# Patient Record
Sex: FEMALE | Race: WHITE | NOT HISPANIC OR LATINO | Employment: FULL TIME | URBAN - METROPOLITAN AREA
[De-identification: names, ages, dates, MRNs, and addresses within clinical notes are randomized per-mention and may not be internally consistent; named-entity substitution may affect disease eponyms.]

---

## 2017-01-10 ENCOUNTER — ROUTINE PRENATAL (OUTPATIENT)
Dept: OBSTETRICS AND GYNECOLOGY | Facility: CLINIC | Age: 27
End: 2017-01-10
Payer: OTHER GOVERNMENT

## 2017-01-10 VITALS
WEIGHT: 152.13 LBS | SYSTOLIC BLOOD PRESSURE: 100 MMHG | BODY MASS INDEX: 27.82 KG/M2 | DIASTOLIC BLOOD PRESSURE: 62 MMHG

## 2017-01-10 DIAGNOSIS — Z34.80 SUPERVISION OF OTHER NORMAL PREGNANCY: Primary | ICD-10-CM

## 2017-01-10 PROCEDURE — 99999 PR PBB SHADOW E&M-EST. PATIENT-LVL II: CPT | Mod: PBBFAC,,, | Performed by: ADVANCED PRACTICE MIDWIFE

## 2017-01-10 PROCEDURE — 0502F SUBSEQUENT PRENATAL CARE: CPT | Mod: ,,, | Performed by: ADVANCED PRACTICE MIDWIFE

## 2017-01-10 PROCEDURE — 87081 CULTURE SCREEN ONLY: CPT

## 2017-01-10 PROCEDURE — 99212 OFFICE O/P EST SF 10 MIN: CPT | Mod: PBBFAC | Performed by: ADVANCED PRACTICE MIDWIFE

## 2017-01-10 NOTE — PROGRESS NOTES
Pt doing well, no c/o 2+ glucose in urine dip - pt had lemonade. Discussed s/s labor, FKCs, warning signs to report. GBS collected. RTC 1wk.  Coffective counseling sheet Protect Breastfeeding discussed with mother. Reinforced avoidence of artifical nipples and formula, unless medically indicated.  Encouraged mother to download Coffective mobile eddi if she has not already done so. Mother verbalizes understanding.  Coffective counseling sheet Nourish discussed with mother. Reinforced basic breastfeeding position and latch as well as proper hand expression technique. Encouraged mother to download Coffective mobile eddi if she has not already done so.  Mother verbalizes understanding.

## 2017-01-10 NOTE — MR AVS SNAPSHOT
[facility]  [address]  [place] [address]  Phone: [phone]  Fax: [phone]                  Gwen Herrera   1/10/2017 2:45 PM   Routine Prenatal    Description:  Female : 1990   Provider:  Terra Shah CNM   Department:  [facility]           Reason for Visit     Routine Prenatal Visit                To Do List           Future Appointments        Provider Department Dept Phone    2017 3:00 PM Terra Shah CNM [facility] -[phone]      Goals (5 Years of Data)     None      [facility]     [facility] -  Assistance  Registered nurses in the [facility] On Call Center provide clinical advisement, health education, appointment booking, and other advisory services.  Call for this free service at [phone].             Medications           Message regarding Medications     Verify the changes and/or additions to your medication regime listed below are the same as discussed with your clinician today.  If any of these changes or additions are incorrect, please notify your healthcare provider.             Verify that the below list of medications is an accurate representation of the medications you are currently taking.  If none reported, the list may be blank. If incorrect, please contact your healthcare provider. Carry this list with you in case of emergency.           Current Medications     prenatal vitamin #45-iron-FA 28 mg iron- 1 mg Chew Take by mouth.           Clinical Reference Information           Prenatal Vitals     Enc. Date GA Prenatal Vitals Prenatal Pulse Pain Level Urine Albumin/Glucose Edema Presentation Dilation/Effacement/Station    1/10/17 36w0d 100/62 / 69 kg (152 lb 1.9 oz)  / 138 / Present   Negative / 2+       16 33w0d 124/72 / 68.5 kg (151 lb 0.2 oz) 35 cm / 130 / Present  0 Trace / Trace None / None / None / No      16 30w0d 108/70 / 64.9 kg (143 lb) 29 cm / 142  / Present   Negative /  Negative None / None      11/14/16 27w6d 96/62 / 65.3 kg (143 lb 15.4 oz) 27 cm / 136 / Present  0 Negative / Negative None / None      10/18/16 24w0d 122/70 / 62.6 kg (138 lb) 24 cm / 148 / Present   Trace / 1+ None / None      9/20/16 20w0d 106/68 / 63 kg (138 lb 14.2 oz) 20 cm / u/s / Present  0 Negative / Negative None / None      8/23/16 16w0d 120/60 / 60 kg (132 lb 4.4 oz)  / 158 / Absent   Negative / Negative       7/26/16 12w0d 112/62 / 58.3 kg (128 lb 8.5 oz)  / 164 / Absent  0 Negative / Negative       6/28/16 8w0d 112/72 / 58.1 kg (128 lb)  / 161 / Absent   Negative / Negative         Vital Signs - Last Recorded  Most recent update: 1/10/2017  2:45 PM by Fabiola Mckinley MA    BP Wt LMP BMI       100/62 69 kg (152 lb 1.9 oz) 05/03/2016 27.82 kg/m2       Allergies as of 1/10/2017     Penicillins      Immunizations Administered on Date of Encounter - 1/10/2017     None

## 2017-01-13 LAB — BACTERIA SPEC AEROBE CULT: NORMAL

## 2017-01-17 ENCOUNTER — ROUTINE PRENATAL (OUTPATIENT)
Dept: OBSTETRICS AND GYNECOLOGY | Facility: CLINIC | Age: 27
End: 2017-01-17
Payer: OTHER GOVERNMENT

## 2017-01-17 VITALS
SYSTOLIC BLOOD PRESSURE: 120 MMHG | BODY MASS INDEX: 27.82 KG/M2 | DIASTOLIC BLOOD PRESSURE: 62 MMHG | WEIGHT: 152.13 LBS

## 2017-01-17 DIAGNOSIS — Z34.80 ENCOUNTER FOR SUPERVISION OF NORMAL PREGNANCY IN MULTIGRAVIDA: Primary | ICD-10-CM

## 2017-01-17 PROCEDURE — 0502F SUBSEQUENT PRENATAL CARE: CPT | Mod: ,,, | Performed by: ADVANCED PRACTICE MIDWIFE

## 2017-01-17 PROCEDURE — 99999 PR PBB SHADOW E&M-EST. PATIENT-LVL II: CPT | Mod: PBBFAC,,, | Performed by: ADVANCED PRACTICE MIDWIFE

## 2017-01-17 PROCEDURE — 99212 OFFICE O/P EST SF 10 MIN: CPT | Mod: PBBFAC | Performed by: ADVANCED PRACTICE MIDWIFE

## 2017-01-17 NOTE — MR AVS SNAPSHOT
[facility]  [address]  [place] [address]  Phone: [phone]  Fax: [phone]                  Gwen Herrera   2017 1:30 PM   Routine Prenatal    Description:  Female : 1990   Provider:  Selma Terry CNM   Department:  [facility]           Reason for Visit     Routine Prenatal Visit                To Do List           Future Appointments        Provider Department Dept Phone    2017 2:30 PM Terra Shah CNM [facility] -[phone]      Goals (5 Years of Data)     None      [facility]     [facility] On Call Nurse Care Line -  Assistance  Registered nurses in the [facility] On Call Center provide clinical advisement, health education, appointment booking, and other advisory services.  Call for this free service at [phone].             Medications           Message regarding Medications     Verify the changes and/or additions to your medication regime listed below are the same as discussed with your clinician today.  If any of these changes or additions are incorrect, please notify your healthcare provider.             Verify that the below list of medications is an accurate representation of the medications you are currently taking.  If none reported, the list may be blank. If incorrect, please contact your healthcare provider. Carry this list with you in case of emergency.           Current Medications     prenatal vitamin #45-iron-FA 28 mg iron- 1 mg Chew Take by mouth.           Clinical Reference Information           Prenatal Vitals     Enc. Date GA Prenatal Vitals Prenatal Pulse Pain Level Urine Albumin/Glucose Edema Presentation Dilation/Effacement/Station    17 37w0d 120/62 / 69 kg (152 lb 1.9 oz)  / 147 / Present   Negative / Negative       1/10/17 36w0d 100/62 / 69 kg (152 lb 1.9 oz) 36 cm / 138 / Present  0 Negative / 2+ None / None / None / No Vertex  / -3    16 33w0d 124/72 / 68.5 kg (151 lb 0.2 oz) 35 cm  / 130 / Present  0 Trace / Trace None / None / None / No      11/29/16 30w0d 108/70 / 64.9 kg (143 lb) 29 cm / 142  / Present   Negative / Negative None / None      11/14/16 27w6d 96/62 / 65.3 kg (143 lb 15.4 oz) 27 cm / 136 / Present  0 Negative / Negative None / None      10/18/16 24w0d 122/70 / 62.6 kg (138 lb) 24 cm / 148 / Present   Trace / 1+ None / None      9/20/16 20w0d 106/68 / 63 kg (138 lb 14.2 oz) 20 cm / u/s / Present  0 Negative / Negative None / None      8/23/16 16w0d 120/60 / 60 kg (132 lb 4.4 oz)  / 158 / Absent   Negative / Negative       7/26/16 12w0d 112/62 / 58.3 kg (128 lb 8.5 oz)  / 164 / Absent  0 Negative / Negative       6/28/16 8w0d 112/72 / 58.1 kg (128 lb)  / 161 / Absent   Negative / Negative         Vital Signs - Last Recorded  Most recent update: 1/17/2017  1:53 PM by Fabiola Mckinley MA    BP Wt LMP BMI       120/62 69 kg (152 lb 1.9 oz) 05/03/2016 27.82 kg/m2       Allergies as of 1/17/2017     Penicillins      Immunizations Administered on Date of Encounter - 1/17/2017     None

## 2017-01-24 ENCOUNTER — ROUTINE PRENATAL (OUTPATIENT)
Dept: OBSTETRICS AND GYNECOLOGY | Facility: CLINIC | Age: 27
End: 2017-01-24
Payer: OTHER GOVERNMENT

## 2017-01-24 VITALS — SYSTOLIC BLOOD PRESSURE: 108 MMHG | WEIGHT: 156.5 LBS | BODY MASS INDEX: 28.63 KG/M2 | DIASTOLIC BLOOD PRESSURE: 60 MMHG

## 2017-01-24 DIAGNOSIS — Z34.80 SUPERVISION OF OTHER NORMAL PREGNANCY: Primary | ICD-10-CM

## 2017-01-24 PROCEDURE — 99212 OFFICE O/P EST SF 10 MIN: CPT | Mod: PBBFAC | Performed by: ADVANCED PRACTICE MIDWIFE

## 2017-01-24 PROCEDURE — 0502F SUBSEQUENT PRENATAL CARE: CPT | Mod: ,,, | Performed by: ADVANCED PRACTICE MIDWIFE

## 2017-01-24 PROCEDURE — 99999 PR PBB SHADOW E&M-EST. PATIENT-LVL II: CPT | Mod: PBBFAC,,, | Performed by: ADVANCED PRACTICE MIDWIFE

## 2017-01-24 NOTE — MR AVS SNAPSHOT
[facility]  [address]  [place] [address]  Phone: [phone]  Fax: [phone]                  Gwen Herrera   2017 3:45 PM   Routine Prenatal    Description:  Female : 1990   Provider:  Terra Shah CNM   Department:  [facility]           Reason for Visit     Routine Prenatal Visit                To Do List           Future Appointments        Provider Department Dept Phone    2017 3:45 PM Terra Shah CNM [facility] -[phone]    2017 2:30 PM Terra Shah CNM [facility] -[phone]      Goals (5 Years of Data)     None      [facility]     [facility] On Call Nurse [facility] -  Assistance  Registered nurses in the [facility] On Call Center provide clinical advisement, health education, appointment booking, and other advisory services.  Call for this free service at [phone].             Medications           Message regarding Medications     Verify the changes and/or additions to your medication regime listed below are the same as discussed with your clinician today.  If any of these changes or additions are incorrect, please notify your healthcare provider.             Verify that the below list of medications is an accurate representation of the medications you are currently taking.  If none reported, the list may be blank. If incorrect, please contact your healthcare provider. Carry this list with you in case of emergency.           Current Medications     prenatal vitamin #45-iron-FA 28 mg iron- 1 mg Chew Take by mouth.           Clinical Reference Information           Prenatal Vitals     Enc. Date GA Prenatal Vitals Prenatal Pulse Pain Level Urine Albumin/Glucose Edema Presentation Dilation/Effacement/Station    17 38w0d 108/60 / 71 kg (156 lb 8.4 oz)  / 136 / Present   Negative / Negative       17 37w0d 120/62 / 69 kg (152 lb 1.9 oz) 37 cm / 147 / Present  0 Negative / Negative None / None / None Vertex  /  -2    1/10/17 36w0d 100/62 / 69 kg (152 lb 1.9 oz) 36 cm / 138 / Present  0 Negative / 2+ None / None / None / No Vertex 1 / 40 / -3    12/20/16 33w0d 124/72 / 68.5 kg (151 lb 0.2 oz) 35 cm / 130 / Present  0 Trace / Trace None / None / None / No      11/29/16 30w0d 108/70 / 64.9 kg (143 lb) 29 cm / 142  / Present   Negative / Negative None / None      11/14/16 27w6d 96/62 / 65.3 kg (143 lb 15.4 oz) 27 cm / 136 / Present  0 Negative / Negative None / None      10/18/16 24w0d 122/70 / 62.6 kg (138 lb) 24 cm / 148 / Present   Trace / 1+ None / None      9/20/16 20w0d 106/68 / 63 kg (138 lb 14.2 oz) 20 cm / u/s / Present  0 Negative / Negative None / None      8/23/16 16w0d 120/60 / 60 kg (132 lb 4.4 oz)  / 158 / Absent   Negative / Negative       7/26/16 12w0d 112/62 / 58.3 kg (128 lb 8.5 oz)  / 164 / Absent  0 Negative / Negative       6/28/16 8w0d 112/72 / 58.1 kg (128 lb)  / 161 / Absent   Negative / Negative         Vital Signs - Last Recorded  Most recent update: 1/24/2017  2:31 PM by Fabiola Mckinley MA    BP Wt LMP BMI       108/60 71 kg (156 lb 8.4 oz) 05/03/2016 28.63 kg/m2       Allergies as of 1/24/2017     Penicillins      Immunizations Administered on Date of Encounter - 1/24/2017     None

## 2017-01-24 NOTE — PROGRESS NOTES
cx soft, head on pubic bone. Coffective counseling sheet Nourish discussed with mother. Reinforced basic breastfeeding position and latch as well as proper hand expression technique. Encouraged mother to download Coffective mobile eddi if she has not already done so.  Mother verbalizes understanding. Coffective counseling sheet Protect Breastfeeding discussed with mother. Reinforced avoidence of artifical nipples and formula, unless medically indicated.  Encouraged mother to download Coffective mobile eddi if she has not already done so. Mother verbalizes understanding. Packing bags for hospital, mom coming in town to stay with her 3 yo. al

## 2017-01-31 ENCOUNTER — ROUTINE PRENATAL (OUTPATIENT)
Dept: OBSTETRICS AND GYNECOLOGY | Facility: CLINIC | Age: 27
End: 2017-01-31
Payer: OTHER GOVERNMENT

## 2017-01-31 VITALS
DIASTOLIC BLOOD PRESSURE: 78 MMHG | BODY MASS INDEX: 28.95 KG/M2 | SYSTOLIC BLOOD PRESSURE: 114 MMHG | WEIGHT: 158.31 LBS

## 2017-01-31 DIAGNOSIS — Z34.80 SUPERVISION OF OTHER NORMAL PREGNANCY: Primary | ICD-10-CM

## 2017-01-31 PROCEDURE — 99999 PR PBB SHADOW E&M-EST. PATIENT-LVL II: CPT | Mod: PBBFAC,,, | Performed by: ADVANCED PRACTICE MIDWIFE

## 2017-01-31 PROCEDURE — 99212 OFFICE O/P EST SF 10 MIN: CPT | Mod: PBBFAC | Performed by: ADVANCED PRACTICE MIDWIFE

## 2017-01-31 PROCEDURE — 0502F SUBSEQUENT PRENATAL CARE: CPT | Mod: ,,, | Performed by: ADVANCED PRACTICE MIDWIFE

## 2017-01-31 NOTE — MR AVS SNAPSHOT
[facility]  [address]  [place] [address]  Phone: [phone]  Fax: [phone]                  Gwen Herrera   2017 2:30 PM   Appointment    Description:  Female : 1990   Provider:  Terra Shah CNM   Department:  [facility]                To Do List           Future Appointments        Provider Department Dept Phone    2017 2:00 PM Faby Lara CNM [facility] -[phone]      Goals (5 Years of Data)     None      [facility]     [facility] Care [facility] -  Assistance  Registered nurses in the [facility] On Call Center provide clinical advisement, health education, appointment booking, and other advisory services.  Call for this free service at [phone].             Medications           Message regarding Medications     Verify the changes and/or additions to your medication regime listed below are the same as discussed with your clinician today.  If any of these changes or additions are incorrect, please notify your healthcare provider.             Verify that the below list of medications is an accurate representation of the medications you are currently taking.  If none reported, the list may be blank. If incorrect, please contact your healthcare provider. Carry this list with you in case of emergency.           Current Medications     prenatal vitamin #45-iron-FA 28 mg iron- 1 mg Chew Take by mouth.           Clinical Reference Information           Prenatal Vitals     Enc. Date GA Prenatal Vitals Prenatal Pulse Pain Level Urine Albumin/Glucose Edema Presentation Dilation/Effacement/Station    17 38w0d 108/60 / 71 kg (156 lb 8.4 oz) 35 cm / 136 / Present   Negative / Negative None / None   60 / -2    17 37w0d 120/62 / 69 kg (152 lb 1.9 oz) 37 cm / 147 / Present  0 Negative / Negative None / None / None Vertex  / -2    1/10/17 36w0d 100/62 / 69 kg (152 lb 1.9 oz) 36 cm / 138 / Present  0 Negative / 2+  None / None / None / No Vertex 1 / 40 / -3    12/20/16 33w0d 124/72 / 68.5 kg (151 lb 0.2 oz) 35 cm / 130 / Present  0 Trace / Trace None / None / None / No      11/29/16 30w0d 108/70 / 64.9 kg (143 lb) 29 cm / 142  / Present   Negative / Negative None / None      11/14/16 27w6d 96/62 / 65.3 kg (143 lb 15.4 oz) 27 cm / 136 / Present  0 Negative / Negative None / None      10/18/16 24w0d 122/70 / 62.6 kg (138 lb) 24 cm / 148 / Present   Trace / 1+ None / None      9/20/16 20w0d 106/68 / 63 kg (138 lb 14.2 oz) 20 cm / u/s / Present  0 Negative / Negative None / None      8/23/16 16w0d 120/60 / 60 kg (132 lb 4.4 oz)  / 158 / Absent   Negative / Negative       7/26/16 12w0d 112/62 / 58.3 kg (128 lb 8.5 oz)  / 164 / Absent  0 Negative / Negative       6/28/16 8w0d 112/72 / 58.1 kg (128 lb)  / 161 / Absent   Negative / Negative         Vital Signs - Last Recorded     LMP                   05/03/2016           Allergies as of 1/31/2017     Penicillins      Immunizations Administered on Date of Encounter - 1/31/2017     None

## 2017-01-31 NOTE — PROGRESS NOTES
Baby's head on pubic bone, rec position changes, hands/knees. No RUC, baby's bag is packed, working on moms. al

## 2017-02-07 ENCOUNTER — ROUTINE PRENATAL (OUTPATIENT)
Dept: OBSTETRICS AND GYNECOLOGY | Facility: CLINIC | Age: 27
End: 2017-02-07
Payer: OTHER GOVERNMENT

## 2017-02-07 VITALS
WEIGHT: 160.94 LBS | SYSTOLIC BLOOD PRESSURE: 120 MMHG | DIASTOLIC BLOOD PRESSURE: 70 MMHG | BODY MASS INDEX: 29.44 KG/M2

## 2017-02-07 DIAGNOSIS — Z34.80 SUPERVISION OF OTHER NORMAL PREGNANCY: Primary | ICD-10-CM

## 2017-02-07 PROCEDURE — 0502F SUBSEQUENT PRENATAL CARE: CPT | Mod: ,,, | Performed by: ADVANCED PRACTICE MIDWIFE

## 2017-02-07 PROCEDURE — 99999 PR PBB SHADOW E&M-EST. PATIENT-LVL II: CPT | Mod: PBBFAC,,, | Performed by: ADVANCED PRACTICE MIDWIFE

## 2017-02-07 PROCEDURE — 99212 OFFICE O/P EST SF 10 MIN: CPT | Mod: PBBFAC | Performed by: ADVANCED PRACTICE MIDWIFE

## 2017-02-07 NOTE — PROGRESS NOTES
Feeling well, no c/o. Good fetal movement. Ready for baby. Cervix soft. Membranes swept. Offered induction next week vs appt with BPP if remains undelivered, discussed risks of longer labor/failed induction. Pt desires to wait for labor to happen on its own. Reviewed FKCs, instructed to report to L&D if dec fetal movement or changes in movement patterns, reviewed s/s labor. RTC 1wk with BPP.

## 2017-02-07 NOTE — MR AVS SNAPSHOT
[facility]  [address]  [place] [address]  Phone: [phone]  Fax: [phone]                  Gwen Herrera   2017 2:00 PM   Routine Prenatal    Description:  Female : 1990   Provider:  Faby Lara CNM   Department:  [facility]           Reason for Visit     Routine Prenatal Visit                To Do List           Future Appointments        Provider Department Dept Phone    2017 1:00 PM ULTRAS[facility], OB-GYN [facility]/ -[phone]    2017 2:15 PM CASEY Cunningham OB/ [facility] [phone]      Goals (5 Years of Data)     None      [facility]     [facility] On Call Nurse Care Line -  Assistance  Registered nurses in the [facility] On Call Center provide clinical advisement, health education, appointment booking, and other advisory services.  Call for this free service at [phone].             Medications           Message regarding Medications     Verify the changes and/or additions to your medication regime listed below are the same as discussed with your clinician today.  If any of these changes or additions are incorrect, please notify your healthcare provider.             Verify that the below list of medications is an accurate representation of the medications you are currently taking.  If none reported, the list may be blank. If incorrect, please contact your healthcare provider. Carry this list with you in case of emergency.           Current Medications     prenatal vitamin #45-iron-FA 28 mg iron- 1 mg Chew Take by mouth.           Clinical Reference Information           Prenatal Vitals     Enc. Date GA Prenatal Vitals Prenatal Pulse Pain Level Urine Albumin/Glucose Edema Presentation Dilation/Effacement/Station    17 40w0d 120/70 / 73 kg (160 lb 15 oz)  / 143 / Present   Negative / Negative       17 39w0d 114/78 / 71.8 kg (158 lb 4.6 oz) 37 cm / 142 / Present   Negative / Negative None /  None  1 / 60 / -3    1/24/17 38w0d 108/60 / 71 kg (156 lb 8.4 oz) 35 cm / 136 / Present   Negative / Negative None / None  1 / 60 / -2    1/17/17 37w0d 120/62 / 69 kg (152 lb 1.9 oz) 37 cm / 147 / Present  0 Negative / Negative None / None / None Vertex 1 / 60 / -2    1/10/17 36w0d 100/62 / 69 kg (152 lb 1.9 oz) 36 cm / 138 / Present  0 Negative / 2+ None / None / None / No Vertex 1 / 40 / -3    12/20/16 33w0d 124/72 / 68.5 kg (151 lb 0.2 oz) 35 cm / 130 / Present  0 Trace / Trace None / None / None / No      11/29/16 30w0d 108/70 / 64.9 kg (143 lb) 29 cm / 142  / Present   Negative / Negative None / None      11/14/16 27w6d 96/62 / 65.3 kg (143 lb 15.4 oz) 27 cm / 136 / Present  0 Negative / Negative None / None      10/18/16 24w0d 122/70 / 62.6 kg (138 lb) 24 cm / 148 / Present   Trace / 1+ None / None      9/20/16 20w0d 106/68 / 63 kg (138 lb 14.2 oz) 20 cm / u/s / Present  0 Negative / Negative None / None      8/23/16 16w0d 120/60 / 60 kg (132 lb 4.4 oz)  / 158 / Absent   Negative / Negative       7/26/16 12w0d 112/62 / 58.3 kg (128 lb 8.5 oz)  / 164 / Absent  0 Negative / Negative       6/28/16 8w0d 112/72 / 58.1 kg (128 lb)  / 161 / Absent   Negative / Negative         Your Vitals Were     BP Weight Last Period BMI       120/70 73 kg (160 lb 15 oz) 05/03/2016 29.44 kg/m2       Allergies as of 2/7/2017     Penicillins      Immunizations Administered on Date of Encounter - 2/7/2017     None      Language Assistance Services     ATTENTION: Language assistance services are available, free of charge. Please call [phone].      ATENCIÓN: Si habla español, tiene a woods disposición servicios gratuitos de asistencia lingüística. Kori al [phone].     CHÚ Ý: N?u b?n nói Ti?ng Vi?t, có các d?ch v? h? tr? ngôn ng? mi?n phí dành cho b?n. G?i s? [phone].         [facility] complies with applicable Federal civil rights laws and does not discriminate on the basis of race, color, national origin, age,  disability, or sex.

## 2017-02-10 ENCOUNTER — TELEPHONE (OUTPATIENT)
Dept: OBSTETRICS AND GYNECOLOGY | Facility: CLINIC | Age: 27
End: 2017-02-10

## 2017-02-10 ENCOUNTER — PATIENT MESSAGE (OUTPATIENT)
Dept: OBSTETRICS AND GYNECOLOGY | Facility: CLINIC | Age: 27
End: 2017-02-10

## 2017-02-10 ENCOUNTER — ROUTINE PRENATAL (OUTPATIENT)
Dept: OBSTETRICS AND GYNECOLOGY | Facility: CLINIC | Age: 27
End: 2017-02-10
Payer: OTHER GOVERNMENT

## 2017-02-10 VITALS — DIASTOLIC BLOOD PRESSURE: 76 MMHG | SYSTOLIC BLOOD PRESSURE: 118 MMHG | BODY MASS INDEX: 29.6 KG/M2 | WEIGHT: 161.81 LBS

## 2017-02-10 DIAGNOSIS — Z34.80 SUPERVISION OF OTHER NORMAL PREGNANCY: Primary | ICD-10-CM

## 2017-02-10 DIAGNOSIS — Z3A.40 40 WEEKS GESTATION OF PREGNANCY: ICD-10-CM

## 2017-02-10 PROCEDURE — 0502F SUBSEQUENT PRENATAL CARE: CPT | Mod: ,,, | Performed by: ADVANCED PRACTICE MIDWIFE

## 2017-02-10 PROCEDURE — 99999 PR PBB SHADOW E&M-EST. PATIENT-LVL II: CPT | Mod: PBBFAC,,, | Performed by: ADVANCED PRACTICE MIDWIFE

## 2017-02-10 PROCEDURE — 99212 OFFICE O/P EST SF 10 MIN: CPT | Mod: PBBFAC | Performed by: ADVANCED PRACTICE MIDWIFE

## 2017-02-10 NOTE — TELEPHONE ENCOUNTER
----- Message from Kathi Franco sent at 2/10/2017 10:41 AM CST -----  Contact: pt   States she is calling to see if she can come in today to have her membranes split and can be reached at [phone]//thanks/dbw

## 2017-02-10 NOTE — PROGRESS NOTES
Here for membrane sweep. Cervix favorable. Membranes swept. Pt tolerated well. Reviewed L&D precautions, kick counts.

## 2017-02-13 ENCOUNTER — PATIENT MESSAGE (OUTPATIENT)
Dept: OBSTETRICS AND GYNECOLOGY | Facility: CLINIC | Age: 27
End: 2017-02-13

## 2017-02-14 ENCOUNTER — TELEPHONE (OUTPATIENT)
Dept: OBSTETRICS AND GYNECOLOGY | Facility: CLINIC | Age: 27
End: 2017-02-14

## 2017-02-14 NOTE — TELEPHONE ENCOUNTER
----- Message from Kiel Jay sent at 2/14/2017 10:07 AM CST -----  Contact: [phone]   Pt would Like to consult with the nurse about labor.  Please call back@ [phone].  Thanks-[facility]

## 2017-02-14 NOTE — TELEPHONE ENCOUNTER
Spoke with pt. Pt would like to schedule her induction for this Friday, 02/17/17. After consulting with Clemencia, advised the pt that Clemencia will contact her. Pt voiced understanding.

## 2017-02-15 RX ORDER — OXYTOCIN/RINGER'S LACTATE 20/1000 ML
2 PLASTIC BAG, INJECTION (ML) INTRAVENOUS CONTINUOUS
Status: CANCELLED | OUTPATIENT
Start: 2017-02-15

## 2017-02-15 RX ORDER — KETOROLAC TROMETHAMINE 30 MG/ML
30 INJECTION, SOLUTION INTRAMUSCULAR; INTRAVENOUS EVERY 6 HOURS
Status: CANCELLED | OUTPATIENT
Start: 2017-02-15 | End: 2017-02-16

## 2017-02-15 RX ORDER — OXYTOCIN/RINGER'S LACTATE 20/1000 ML
41.65 PLASTIC BAG, INJECTION (ML) INTRAVENOUS CONTINUOUS
Status: CANCELLED | OUTPATIENT
Start: 2017-02-15 | End: 2017-02-15

## 2017-02-15 RX ORDER — IBUPROFEN 200 MG
800 TABLET ORAL EVERY 8 HOURS
Status: CANCELLED | OUTPATIENT
Start: 2017-02-16

## 2017-02-15 RX ORDER — MISOPROSTOL 100 UG/1
200 TABLET ORAL
Status: CANCELLED | OUTPATIENT
Start: 2017-02-15

## 2017-02-15 RX ORDER — SODIUM CHLORIDE, SODIUM LACTATE, POTASSIUM CHLORIDE, CALCIUM CHLORIDE 600; 310; 30; 20 MG/100ML; MG/100ML; MG/100ML; MG/100ML
INJECTION, SOLUTION INTRAVENOUS CONTINUOUS
Status: CANCELLED | OUTPATIENT
Start: 2017-02-15

## 2017-02-15 RX ORDER — ONDANSETRON 4 MG/1
8 TABLET, ORALLY DISINTEGRATING ORAL EVERY 8 HOURS PRN
Status: CANCELLED | OUTPATIENT
Start: 2017-02-15

## 2017-02-15 RX ORDER — METHYLERGONOVINE MALEATE 0.2 MG/ML
200 INJECTION INTRAVENOUS
Status: CANCELLED | OUTPATIENT
Start: 2017-02-15

## 2017-02-15 RX ORDER — OXYTOCIN/RINGER'S LACTATE 20/1000 ML
20 PLASTIC BAG, INJECTION (ML) INTRAVENOUS ONCE
Status: CANCELLED | OUTPATIENT
Start: 2017-02-15 | End: 2017-02-15

## 2017-02-15 RX ORDER — CARBOPROST TROMETHAMINE 250 UG/ML
250 INJECTION, SOLUTION INTRAMUSCULAR
Status: CANCELLED | OUTPATIENT
Start: 2017-02-15

## 2017-02-16 ENCOUNTER — PROCEDURE VISIT (OUTPATIENT)
Dept: OBSTETRICS AND GYNECOLOGY | Facility: CLINIC | Age: 27
End: 2017-02-16
Payer: OTHER GOVERNMENT

## 2017-02-16 VITALS
BODY MASS INDEX: 29.64 KG/M2 | DIASTOLIC BLOOD PRESSURE: 82 MMHG | WEIGHT: 162.06 LBS | SYSTOLIC BLOOD PRESSURE: 120 MMHG

## 2017-02-16 DIAGNOSIS — O48.0 POST-TERM PREGNANCY, 40-42 WEEKS OF GESTATION: ICD-10-CM

## 2017-02-16 DIAGNOSIS — O48.0 POST-TERM PREGNANCY, 40-42 WEEKS OF GESTATION: Primary | ICD-10-CM

## 2017-02-16 PROCEDURE — 99999 PR PBB SHADOW E&M-EST. PATIENT-LVL II: CPT | Mod: PBBFAC,,, | Performed by: ADVANCED PRACTICE MIDWIFE

## 2017-02-16 PROCEDURE — 0502F SUBSEQUENT PRENATAL CARE: CPT | Mod: ,,, | Performed by: ADVANCED PRACTICE MIDWIFE

## 2017-02-16 PROCEDURE — 76819 FETAL BIOPHYS PROFIL W/O NST: CPT | Mod: 26,S$PBB,, | Performed by: OBSTETRICS & GYNECOLOGY

## 2017-02-17 ENCOUNTER — ANESTHESIA (OUTPATIENT)
Dept: OBSTETRICS AND GYNECOLOGY | Facility: HOSPITAL | Age: 27
End: 2017-02-17
Payer: OTHER GOVERNMENT

## 2017-02-17 ENCOUNTER — HOSPITAL ENCOUNTER (INPATIENT)
Facility: HOSPITAL | Age: 27
LOS: 2 days | Discharge: HOME | End: 2017-02-19
Attending: OBSTETRICS & GYNECOLOGY | Admitting: OBSTETRICS & GYNECOLOGY
Payer: OTHER GOVERNMENT

## 2017-02-17 LAB
ABO + RH BLD: NORMAL
BASOPHILS # BLD AUTO: 0.03 K/UL
BASOPHILS NFR BLD: 0.3 %
BLD GP AB SCN CELLS X3 SERPL QL: NORMAL
DIFFERENTIAL METHOD: ABNORMAL
EOSINOPHIL # BLD AUTO: 0.2 K/UL
EOSINOPHIL NFR BLD: 1.4 %
ERYTHROCYTE [DISTWIDTH] IN BLOOD BY AUTOMATED COUNT: 14.5 %
HCT VFR BLD AUTO: 32.7 %
HGB BLD-MCNC: 10.9 G/DL
LYMPHOCYTES # BLD AUTO: 2 K/UL
LYMPHOCYTES NFR BLD: 18.8 %
MCH RBC QN AUTO: 26.6 PG
MCHC RBC AUTO-ENTMCNC: 33.3 %
MCV RBC AUTO: 80 FL
MONOCYTES # BLD AUTO: 0.9 K/UL
MONOCYTES NFR BLD: 8.8 %
NEUTROPHILS # BLD AUTO: 7.5 K/UL
NEUTROPHILS NFR BLD: 70.7 %
PLATELET # BLD AUTO: 223 K/UL
PMV BLD AUTO: 10.6 FL
RBC # BLD AUTO: 4.1 M/UL
WBC # BLD AUTO: 10.56 K/UL

## 2017-02-17 PROCEDURE — 25000003 PHARM REV CODE 250: Performed by: ADVANCED PRACTICE MIDWIFE

## 2017-02-17 PROCEDURE — 85025 COMPLETE CBC W/AUTO DIFF WBC: CPT

## 2017-02-17 PROCEDURE — 86900 BLOOD TYPING SEROLOGIC ABO: CPT

## 2017-02-17 PROCEDURE — 10907ZC DRAINAGE OF AMNIOTIC FLUID, THERAPEUTIC FROM PRODUCTS OF CONCEPTION, VIA NATURAL OR ARTIFICIAL OPENING: ICD-10-PCS | Performed by: OBSTETRICS & GYNECOLOGY

## 2017-02-17 PROCEDURE — 25000003 PHARM REV CODE 250: Performed by: OBSTETRICS & GYNECOLOGY

## 2017-02-17 PROCEDURE — 86901 BLOOD TYPING SEROLOGIC RH(D): CPT

## 2017-02-17 PROCEDURE — 62326 NJX INTERLAMINAR LMBR/SAC: CPT | Performed by: ANESTHESIOLOGY

## 2017-02-17 PROCEDURE — 59400 OBSTETRICAL CARE: CPT | Mod: ,,, | Performed by: OBSTETRICS & GYNECOLOGY

## 2017-02-17 PROCEDURE — 72100002 HC LABOR CARE, 1ST 8 HOURS

## 2017-02-17 PROCEDURE — 27800517 HC TRAY,EPIDURAL-CONTINUOUS: Performed by: NURSE ANESTHETIST, CERTIFIED REGISTERED

## 2017-02-17 PROCEDURE — 25000003 PHARM REV CODE 250: Performed by: NURSE ANESTHETIST, CERTIFIED REGISTERED

## 2017-02-17 PROCEDURE — 63600175 PHARM REV CODE 636 W HCPCS: Performed by: NURSE ANESTHETIST, CERTIFIED REGISTERED

## 2017-02-17 PROCEDURE — 11000001 HC ACUTE MED/SURG PRIVATE ROOM

## 2017-02-17 PROCEDURE — 72100003 HC LABOR CARE, EA. ADDL. 8 HRS

## 2017-02-17 PROCEDURE — 72200005 HC VAGINAL DELIVERY LEVEL II

## 2017-02-17 PROCEDURE — 3E033VJ INTRODUCTION OF OTHER HORMONE INTO PERIPHERAL VEIN, PERCUTANEOUS APPROACH: ICD-10-PCS | Performed by: OBSTETRICS & GYNECOLOGY

## 2017-02-17 PROCEDURE — 51701 INSERT BLADDER CATHETER: CPT

## 2017-02-17 RX ORDER — ONDANSETRON 8 MG/1
8 TABLET, ORALLY DISINTEGRATING ORAL EVERY 8 HOURS PRN
Status: DISCONTINUED | OUTPATIENT
Start: 2017-02-17 | End: 2017-02-17

## 2017-02-17 RX ORDER — AMMONIA 15 % (W/V)
0.3 AMPUL (EA) INHALATION CONTINUOUS PRN
Status: DISCONTINUED | OUTPATIENT
Start: 2017-02-17 | End: 2017-02-19 | Stop reason: HOSPADM

## 2017-02-17 RX ORDER — HYDROCORTISONE 25 MG/G
CREAM TOPICAL 3 TIMES DAILY PRN
Status: DISCONTINUED | OUTPATIENT
Start: 2017-02-17 | End: 2017-02-19 | Stop reason: HOSPADM

## 2017-02-17 RX ORDER — FAMOTIDINE 10 MG/ML
20 INJECTION INTRAVENOUS ONCE
Status: DISCONTINUED | OUTPATIENT
Start: 2017-02-17 | End: 2017-02-17

## 2017-02-17 RX ORDER — METOCLOPRAMIDE HYDROCHLORIDE 5 MG/ML
10 INJECTION INTRAMUSCULAR; INTRAVENOUS ONCE
Status: DISCONTINUED | OUTPATIENT
Start: 2017-02-17 | End: 2017-02-17

## 2017-02-17 RX ORDER — OXYTOCIN/RINGER'S LACTATE 20/1000 ML
333 PLASTIC BAG, INJECTION (ML) INTRAVENOUS
Status: DISCONTINUED | OUTPATIENT
Start: 2017-02-17 | End: 2017-02-17

## 2017-02-17 RX ORDER — SODIUM CHLORIDE, SODIUM LACTATE, POTASSIUM CHLORIDE, CALCIUM CHLORIDE 600; 310; 30; 20 MG/100ML; MG/100ML; MG/100ML; MG/100ML
INJECTION, SOLUTION INTRAVENOUS CONTINUOUS
Status: DISCONTINUED | OUTPATIENT
Start: 2017-02-17 | End: 2017-02-17

## 2017-02-17 RX ORDER — ONDANSETRON 8 MG/1
8 TABLET, ORALLY DISINTEGRATING ORAL EVERY 8 HOURS PRN
Status: DISCONTINUED | OUTPATIENT
Start: 2017-02-17 | End: 2017-02-19 | Stop reason: HOSPADM

## 2017-02-17 RX ORDER — ROPIVACAINE IN 0.9% SOD CHL/PF 0.2 %
PLASTIC BAG, INJECTION (ML) EPIDURAL CONTINUOUS
Status: DISCONTINUED | OUTPATIENT
Start: 2017-02-17 | End: 2017-02-17

## 2017-02-17 RX ORDER — OXYCODONE AND ACETAMINOPHEN 5; 325 MG/1; MG/1
1 TABLET ORAL EVERY 4 HOURS PRN
Status: DISCONTINUED | OUTPATIENT
Start: 2017-02-17 | End: 2017-02-19 | Stop reason: HOSPADM

## 2017-02-17 RX ORDER — ROPIVACAINE HYDROCHLORIDE 2 MG/ML
INJECTION, SOLUTION EPIDURAL; INFILTRATION; PERINEURAL
Status: DISCONTINUED | OUTPATIENT
Start: 2017-02-17 | End: 2017-02-17

## 2017-02-17 RX ORDER — LIDOCAINE HYDROCHLORIDE AND EPINEPHRINE 15; 5 MG/ML; UG/ML
INJECTION, SOLUTION EPIDURAL
Status: DISCONTINUED | OUTPATIENT
Start: 2017-02-17 | End: 2017-02-17

## 2017-02-17 RX ORDER — ACETAMINOPHEN 325 MG/1
650 TABLET ORAL EVERY 6 HOURS PRN
Status: DISCONTINUED | OUTPATIENT
Start: 2017-02-17 | End: 2017-02-19 | Stop reason: HOSPADM

## 2017-02-17 RX ORDER — DIPHENHYDRAMINE HCL 25 MG
25 CAPSULE ORAL EVERY 4 HOURS PRN
Status: DISCONTINUED | OUTPATIENT
Start: 2017-02-17 | End: 2017-02-19 | Stop reason: HOSPADM

## 2017-02-17 RX ORDER — SODIUM CITRATE AND CITRIC ACID MONOHYDRATE 334; 500 MG/5ML; MG/5ML
30 SOLUTION ORAL ONCE
Status: DISCONTINUED | OUTPATIENT
Start: 2017-02-17 | End: 2017-02-17

## 2017-02-17 RX ORDER — ROPIVACAINE HYDROCHLORIDE 2 MG/ML
INJECTION, SOLUTION EPIDURAL; INFILTRATION; PERINEURAL CONTINUOUS PRN
Status: DISCONTINUED | OUTPATIENT
Start: 2017-02-17 | End: 2017-02-17

## 2017-02-17 RX ORDER — DOCUSATE SODIUM 100 MG/1
100 CAPSULE, LIQUID FILLED ORAL DAILY
Status: DISCONTINUED | OUTPATIENT
Start: 2017-02-17 | End: 2017-02-19 | Stop reason: HOSPADM

## 2017-02-17 RX ORDER — IBUPROFEN 600 MG/1
600 TABLET ORAL EVERY 6 HOURS
Status: DISCONTINUED | OUTPATIENT
Start: 2017-02-17 | End: 2017-02-19 | Stop reason: HOSPADM

## 2017-02-17 RX ORDER — OXYCODONE AND ACETAMINOPHEN 10; 325 MG/1; MG/1
1 TABLET ORAL EVERY 4 HOURS PRN
Status: DISCONTINUED | OUTPATIENT
Start: 2017-02-17 | End: 2017-02-19 | Stop reason: HOSPADM

## 2017-02-17 RX ORDER — PROMETHAZINE HYDROCHLORIDE 25 MG/1
25 TABLET ORAL EVERY 6 HOURS PRN
Status: DISCONTINUED | OUTPATIENT
Start: 2017-02-17 | End: 2017-02-19 | Stop reason: HOSPADM

## 2017-02-17 RX ORDER — FAMOTIDINE 20 MG/1
20 TABLET, FILM COATED ORAL 2 TIMES DAILY
Status: DISCONTINUED | OUTPATIENT
Start: 2017-02-17 | End: 2017-02-17

## 2017-02-17 RX ORDER — FAMOTIDINE 20 MG/1
20 TABLET, FILM COATED ORAL 2 TIMES DAILY
Status: DISCONTINUED | OUTPATIENT
Start: 2017-02-17 | End: 2017-02-19 | Stop reason: HOSPADM

## 2017-02-17 RX ORDER — HYDROCORTISONE 1 %
CREAM (GRAM) TOPICAL 3 TIMES DAILY
Status: DISCONTINUED | OUTPATIENT
Start: 2017-02-17 | End: 2017-02-17

## 2017-02-17 RX ORDER — OXYTOCIN/RINGER'S LACTATE 20/1000 ML
2 PLASTIC BAG, INJECTION (ML) INTRAVENOUS CONTINUOUS
Status: DISCONTINUED | OUTPATIENT
Start: 2017-02-17 | End: 2017-02-17

## 2017-02-17 RX ADMIN — ROPIVACAINE HYDROCHLORIDE 10 ML/HR: 2 INJECTION, SOLUTION EPIDURAL; INFILTRATION at 10:02

## 2017-02-17 RX ADMIN — Medication 2 MILLI-UNITS/MIN: at 06:02

## 2017-02-17 RX ADMIN — HYDROCORTISONE: 1 CREAM TOPICAL at 09:02

## 2017-02-17 RX ADMIN — FAMOTIDINE 20 MG: 20 TABLET, FILM COATED ORAL at 08:02

## 2017-02-17 RX ADMIN — SODIUM CHLORIDE, SODIUM LACTATE, POTASSIUM CHLORIDE, AND CALCIUM CHLORIDE: .6; .31; .03; .02 INJECTION, SOLUTION INTRAVENOUS at 11:02

## 2017-02-17 RX ADMIN — ROPIVACAINE HYDROCHLORIDE 10 ML: 2 INJECTION, SOLUTION EPIDURAL; INFILTRATION; PERINEURAL at 10:02

## 2017-02-17 RX ADMIN — LIDOCAINE HYDROCHLORIDE AND EPINEPHRINE 3 ML: 15; 5 INJECTION, SOLUTION EPIDURAL; INFILTRATION; INTRACAUDAL; PERINEURAL at 10:02

## 2017-02-17 RX ADMIN — DOCUSATE SODIUM 100 MG: 100 CAPSULE, LIQUID FILLED ORAL at 03:02

## 2017-02-17 RX ADMIN — SODIUM CHLORIDE, SODIUM LACTATE, POTASSIUM CHLORIDE, AND CALCIUM CHLORIDE 1000 ML: .6; .31; .03; .02 INJECTION, SOLUTION INTRAVENOUS at 10:02

## 2017-02-17 RX ADMIN — FAMOTIDINE 20 MG: 20 TABLET, FILM COATED ORAL at 06:02

## 2017-02-17 RX ADMIN — Medication 333 MILLI-UNITS/MIN: at 02:02

## 2017-02-17 RX ADMIN — SODIUM CHLORIDE, SODIUM LACTATE, POTASSIUM CHLORIDE, AND CALCIUM CHLORIDE: .6; .31; .03; .02 INJECTION, SOLUTION INTRAVENOUS at 05:02

## 2017-02-17 RX ADMIN — IBUPROFEN 600 MG: 600 TABLET, FILM COATED ORAL at 05:02

## 2017-02-17 NOTE — ANESTHESIA PREPROCEDURE EVALUATION
02/17/2017  Gwen Herrera is a 26 y.o., female.    [facility] Pre-op Assessment    I have reviewed the Patient Summary Reports.     I have reviewed the Nursing Notes.   I have reviewed the Medications.     Review of Systems  Anesthesia Hx:  No previous Anesthesia  Neg history of prior surgery. Denies Family Hx of Anesthesia complications.   Denies Personal Hx of Anesthesia complications.   Social:  No Alcohol Use, Non-Smoker    Hematology/Oncology:  Hematology Normal   Oncology Normal     EENT/Dental:EENT/Dental Normal   Cardiovascular:  Cardiovascular Normal     Pulmonary:  Pulmonary Normal    Renal/:  Renal/ Normal     Hepatic/GI:  Hepatic/GI Normal    Musculoskeletal:  Musculoskeletal Normal    Neurological:  Neurology Normal    Psych:  Psychiatric Normal           Physical Exam  General:  Well nourished    Airway/Jaw/Neck:  Airway Findings: Mouth Opening: Normal Tongue: Normal  General Airway Assessment: Adult  Mallampati: II  Improves to II with phonation.  TM Distance: Normal, at least 6 cm       Chest/Lungs:  Chest/Lungs Findings: Clear to auscultation, Normal Respiratory Rate     Heart/Vascular:  Heart Findings: Rate: Normal        Mental Status:  Mental Status Findings:  Alert and Oriented, Cooperative         Anesthesia Plan  Type of Anesthesia, risks & benefits discussed:  Anesthesia Type:  epidural  Patient's Preference:   Intra-op Monitoring Plan:   Intra-op Monitoring Plan Comments:   Post Op Pain Control Plan:   Post Op Pain Control Plan Comments:   Induction:    Beta Blocker:  Patient is not currently on a Beta-Blocker (No further documentation required).       Informed Consent: Patient understands risks and agrees with Anesthesia plan.  Questions answered. Anesthesia consent signed with patient.  ASA Score: 2     Day of Surgery Review of History & Physical: I have interviewed and examined  the patient. I have reviewed the patient's H&P dated:  There are no significant changes.

## 2017-02-17 NOTE — PROGRESS NOTES
"S: Resting, no complaints. Discussed POC with patient and SO, v/u  O:  VS reviewed, afebrile   Vitals:    02/17/17 0515 02/17/17 0527 02/17/17 0616 02/17/17 0632   BP: 118/89  133/80 134/78   Pulse: (!) 112  91 93   Resp: 18      Temp: 98.3 °F (36.8 °C)      TempSrc: Oral      Weight:  72.6 kg (160 lb)     Height:  5' 2" (1.575 m)       FHTs: Category 1  UC: every 2-4 minutes; mild  SVE: deferred  Pitocin @ 2 mu/min    A: IUP @ 41w3d; IOL post-dates    Patient Active Problem List   Diagnosis    Supervision of other normal pregnancy    Indication for care or intervention related to labor and delivery     P: Increase pitocin as needed  Epidural on request    "

## 2017-02-17 NOTE — IP AVS SNAPSHOT
[facility]  [id number] [place] [address] [address] Nikita CROSS [id number]           Patient Discharge Instructions     Our goal is to set you up for success. This packet includes information on your condition, medications, and your home care. It will help you to care for yourself so you don't get sicker and need to go back to the hospital.     Please ask your nurse if you have any questions.        There are many details to remember when preparing to leave the hospital. Here is what you will need to do:    1. Take your medicine. If you are prescribed medications, review your Medication List in the following pages. You may have new medications to  at the pharmacy and others that you'll need to stop taking. Review the instructions for how and when to take your medications. Talk with your doctor or nurses if you are unsure of what to do.     2. Go to your follow-up appointments. Specific follow-up information is listed in the following pages. Your may be contacted by a transition nurse or clinical provider about future appointments. Be sure we have all of the phone numbers to reach you, if needed. Please contact your provider's office if you are unable to make an appointment.     3. Watch for warning signs. Your doctor or nurse will give you detailed warning signs to watch for and when to call for assistance. These instructions may also include educational information about your condition. If you experience any of warning signs to your health, call your doctor.               [facility]  Unless otherwise directed by your provider, please contact [facility], our nurse care line that is available for 24/7 assistance.     [phone] (toll-free)    Registered nurses in the [facility] provide clinical advisement, health education, appointment booking, and other advisory services.                    ** Verify the list of medication(s) below is accurate and up to date. Carry this with you  "in case of emergency. If your medications have changed, please notify your healthcare provider.             Medication List      CONTINUE taking these medications        Additional Info                      prenatal vitamin #45-iron-FA 28 mg iron- 1 mg Chew   Refills:  0    Instructions:  Take by mouth.     Begin Date    AM    Noon    PM    Bedtime                  Please bring to all follow up appointments:    1. A copy of your discharge instructions.  2. All medicines you are currently taking in their original bottles.  3. Identification and insurance card.    Please arrive 15 minutes ahead of scheduled appointment time.    Please call 24 hours in advance if you must reschedule your appointment and/or time.        Your Scheduled Appointments     Mar 20, 2017  9:15 AM CDT   Post Partum with Terra Shah CNM   [facility]    [address] [facility] [id number] [phone]              Follow-up Information     Follow up with Terra Shah CNM. Go on 3/20/2017.    Specialty:  Obstetrics    Why:  PP    Contact information:    [address]  [facility] [address] [phone]            Discharge Instructions       Mother Self Care:    Activity: Avoid strenuous exercise and get adequate rest.  No driving until the physician consent given.  Emotional Changes: Most women find birth to be a time of great emotional upheaval.  Sense of loss, mood swings, fatigue, anxiety, and feeling "let down" are common.  If feelings worsen or last more than a week, call your physician.  Breast Care/Breastfeeding: Wear a bra for comfort.  Keep nipples dry and apply your own breast milk or lanolin cream as needed for soreness.  Engorgement can be relieved with warm, moist heat before feedings.  You may also take Ibuprofen.  Breast Care/Bottle Feeding: Wear support bra 24 hours a day for one week.  Avoid stimulation to breasts.  You may use ice packs for discomfort.  Callie-Care/Vaginal Bleeding: Remember to use " your puma-bottle after urinating.  Your flow will change from red, to pink, to yellow/white color over a period of 2 weeks.  Menstruation will return in 3-8 weeks, or longer if breastfeeding.  Episiotomy Vaginal Delivery: Stitches will dissolve within 10 days to 3 weeks.  Warm baths, tucks, and dermoplast will promote healing.  Avoid bubble baths or strong soaps.   Section/Tubal Ligation: Keep incision clean and dry.  Please remove steri-strips in 5-7 days.  You may shower, but avoid baths.  Sexual Activity/Pelvic Rest: No sexual activity, tampons, or douching until your physician gives you consent.  Diet: Continue to eat from the five basic food groups, including plenty of protein, fruits, vegetables, and whole grains.  Limit empty calories and high fat foods.  Drink enough fluids to satisfy thirst and add an extra 500 calories for breastfeeding.  Constipation/Hemorrhoids: Drink plenty of water.  You may take a stool softener or natural laxative (Metamucil). You may use tucks or hemorrhoid ointment and soak in a warm tub.    CALL YOUR OB DOCTOR IF ANY OF THE FOLLOWING OCCURS:  *Heavy bleeding - saturating a pad an hour or passing any large (2-3 inches in size) blood clots.  *Any pain, redness, or tenderness in lower leg.  *You cannot care for yourself or your baby.  *Any signs of infection-      - Temperature greater than 100.5 degrees F      - Foul smelling vaginal discharge and/or incisional drainage      - Increased episiotomy or incisional pain      - Hot, hard, red or sore area on breast      - Flu-like symptoms      - Any urgency, frequency or burning with urination      Primary Diagnosis     Your primary diagnosis was:  Normal Vaginal Delivery      Admission Information     Date & Time Provider Department CSN    2017  4:47 AM Angelina Smith MD [facility] [id number]      Care Providers     Provider Role Specialty Primary office phone    Angelina Smith MD Attending Provider  "Obstetrics and Gynecology [phone]      Your Vitals Were     BP Pulse Temp Resp Height Weight    119/73 104 98.4 °F (36.9 °C) (Oral) 18 5' 2" (1.575 m) 72.6 kg (160 lb)    Last Period SpO2 BMI          05/03/2016 100% 29.26 kg/m2        Recent Lab Values     No lab values to display.      Allergies as of 2/19/2017        Reactions    Penicillins Anaphylaxis, Rash    Clindamycin Swelling, Rash      Advance Directives     An advance directive is a document which, in the event you are no longer able to make decisions for yourself, tells your healthcare team what kind of treatment you do or do not want to receive, or who you would like to make those decisions for you.  If you do not currently have an advance directive, [facility] encourages you to create one.  For more information call:  [phone]), [phone] ([phone]),  or log on to [email or web address].        Language Assistance Services     ATTENTION: Language assistance services are available, free of charge. Please call [phone].      ATENCIÓN: Si habla español, tiene a woods disposición servicios gratuitos de asistencia lingüística. Llame al [phone].     [facility] Ý: N?u b?n nói Ti?ng Vi?t, có các d?ch v? h? tr? ngôn ng? mi?n phí dành cho b?n. G?i s? [phone].         [facility] complies with applicable Federal civil rights laws and does not discriminate on the basis of race, color, national origin, age, disability, or sex.        "

## 2017-02-17 NOTE — ANESTHESIA PROCEDURE NOTES
Epidural    Patient location during procedure: OB   Reason for block: primary anesthetic   Diagnosis: iup with labor pain   Start time: 2/17/2017 10:40 AM  Timeout: 2/17/2017 10:40 AM  End time: 2/17/2017 10:46 AM  Staffing  Anesthesiologist: JR MAGANA  Resident/CRNA: RAHEEM MORALES  Performed by: anesthesiologist   Preanesthetic Checklist  Completed: patient identified, pre-op evaluation, timeout performed, IV checked, risks and benefits discussed, monitors and equipment checked, anesthesia consent given, hand hygiene performed and patient being monitored  Preparation  Patient position: sitting  Prep: Betadine  Patient monitoring: Pulse Ox  Epidural  Skin Anesthetic: lidocaine 1%  Skin Wheal: 3 mL  Administration type: continuous  Approach: midline  Interspace: L3-4  Injection technique: EDISON air  Needle and Epidural Catheter  Needle type: Tuohy   Needle gauge: 18  Needle length: 3.5 inches  Needle insertion depth: 5 cm  Catheter type: multi-orifice  Catheter size: 20 G  Catheter at skin depth: 13 cm  Test dose: 4 mL of lidocaine 1.5% with Epi 1-to-200,000  Additional Documentation: incremental injection, no signs/symptoms of IV or SA injection, negative aspiration for heme and CSF, no paresthesia on injection, no significant pain on injection and no significant complaints from patient  Needle localization: anatomical landmarks  Medications:  Bolus administered: 10 mL of 0.2% ropivacaine  Epinephrine added: none  Assessment  Upper dermatomal levels - Left: T8  Right: T8   Dermatomal levels determined by alcohol wipe  Ease of block: easy  Patient's tolerance of the procedure: comfortable throughout block and no complaints

## 2017-02-17 NOTE — PROGRESS NOTES
"Discussed feeding choice with mother.  Reviewed benefits of breastfeeding.  Patient given "What to Expect in the First 48 Hours" handout. Mother states her intention is breastfeeding. Coffective counseling sheet Fall in Love discussed with mother. Reinforced immediate skin to skin, the magic first hour, importance of the first feeding and delaying routine procedures. Encouraged mother to download Coffective mobile eddi if she has not already done so. Mother verbalies understanding.  "

## 2017-02-17 NOTE — L&D DELIVERY NOTE
of viable male infant over intact perineum. Anterior shoulder delivered with gentle traction. Mouth and nose suctioned with bulb syringe. Infant to mother's abdomen. Delayed cord clamping for approx 1-2 min. Active management of third stage performed. Placenta delivered via [place]. Fundus firm and bleeding normal.  ml.  Inspection of perineum reveals no lacerations. Mother and infant stable and remain skin to skin. Apgars 9/9.       Delivery Information for  Randall Herrera    Birth information:  YOB: 2017   Time of birth: 2:23 PM   Sex: male   Head Delivery Date/Time: 2017  2:22 PM   Delivery type: Vaginal, Spontaneous Delivery   Gestational Age: 41w3d    Delivery Providers    Delivering clinician:  MICHAEL MORLEY   Other personnel:   Provider Role   WELLINGTON PALACIOS Registered Nurse   JUNIOR VILLATORO Registered Nurse                      Abrams Assessment    Living status:  Yes   Apgars    1 Minute:   5 Minute:   10 Minute 15 Minute 20 Minute   Skin Color: 1  1       Heart Rate: 2  2       Reflex Irritability: 2  2       Muscle Tone: 2  2       Respiratory Effort: 2  2       Total: 9  9                  Apgars Assigned By:  JUNIOR VILLATORO RN          Assisted Delivery Details:    Forceps attempted?:  No   Vacuum extractor attempted?:  No             Shoulder Dystocia    Shoulder dystocia present?:  No                                             Presentation and Position    Presentation: Vertex   Position:                    Interventions/Resuscitation    Method:  Bulb Suctioning, Tactile Stimulation        Cord    Complications:  None   Delayed Cord Clamping?:  Yes   Cord Clamped Date/Time:  2017  2:25 PM   Cord Blood Disposition:  Lab   Gases Sent?:  No   Stem Cell Collection (by MD):  No        Placenta    Date and time:  2017  2:27 PM   Removal:  Spontaneous   Appearance:  Intact   Placenta disposition:  Discarded            Labor Events:       labor: No      Labor Onset Date/Time:         Dilation Complete Date/Time:         Start Pushing Date/Time:       Rupture Date/Time:              Rupture type:           Fluid Amount:        Fluid Color:        Fluid Odor:        Membrane Status (PeriCalm): ARM (Artificial Rupture)      Rupture Date/Time (PeriCalm): 2017 11:51:00      Fluid Amount (PeriCalm): Small      Fluid Color (PeriCalm): Clear       steroids: None     Antibiotics given for GBS: No     Induction: oxytocin     Indications for induction:  Post-term Gestation     Augmentation: amniotomy     Indications for augmentation:       Labor complications: None     Additional complications:          Cervical ripening:                     Delivery:      Episiotomy: None     Indication for Episiotomy:       Perineal Lacerations: None Repaired:      Periurethral Laceration: none Repaired:     Labial Laceration: none Repaired:     Sulcus Laceration: none Repaired:     Vaginal Laceration: No Repaired:     Cervical Laceration: No Repaired:     Repair suture: None     Repair # of packets: 0     Blood loss (ml): 200     Vaginal Sweep Performed: No     Surgicount Correct:         Other providers:       Anesthesia    Method:  Epidural              Details (if applicable):  Trial of Labor      Categorization:      Priority:     Indications for :     Incision Type:       Additional  information:  Forceps:    Vacuum:    Breech:    Observed anomalies    Other (Comments):

## 2017-02-17 NOTE — PROGRESS NOTES
S:  O:  VS reviewed, afebrile   Vitals:    02/17/17 1003 02/17/17 1017 02/17/17 1036 02/17/17 1115   BP: 139/79 (!) 171/81     Pulse: 87 94 (!) 154    Resp: 16      Temp:    98.2 °F (36.8 °C)   TempSrc:    Oral   SpO2:   100%    Weight:       Height:         FHTs: Cat 2; occasional variable decels but overall reassuring  UC: every 2-3 minutes; strong  SVE: 6/90/-1; + bloody show; AROM with amniohook scant amount clear fluid  Pitocin @ 4 mu/min    A: IUP @ 41w3d; IOL post-dates    Patient Active Problem List   Diagnosis    Supervision of other normal pregnancy    Indication for care or intervention related to labor and delivery     P: Increase pitocin PRN  SVE in 2 hours or PRN

## 2017-02-17 NOTE — H&P
[facility]   History & Physical  Obstetrics      SUBJECTIVE:     Chief Complaint/Reason for Admission: induction of labor    History of Present Illness:  Gwen Herrera is a 26 y.o.  female with an Estimated Date of Delivery: 17 admitted for induction of labor.  Her current obstetrical history is insignificant.  She reports rash with sudden onset 2 days ago, beginning in stretch marks on abdomen and spreading to upper arms. Fetal Movement: normal.    PTA Medications   Medication Sig    prenatal vitamin #45-iron-FA 28 mg iron- 1 mg Chew Take by mouth.       Review of patient's allergies indicates:   Allergen Reactions    Penicillins Anaphylaxis and Rash        Past Medical History   Diagnosis Date    Anxiety      No past surgical history on file.  Family History   Problem Relation Age of Onset    Breast cancer Neg Hx     Colon cancer Neg Hx     Ovarian cancer Neg Hx      Social History   Substance Use Topics    Smoking status: Never Smoker    Smokeless tobacco: None    Alcohol use No       Review of Systems  Constitutional: no fever or chills  Eyes: no visual changes  Respiratory: no cough or shortness of breath  Cardiovascular: no chest pain or palpitations  Gastrointestinal: no nausea or vomiting, tolerating diet     OBJECTIVE:     Vital Signs (Most Recent):       Physical Exam:  General:  alert and normal appearing gravid female   Skin:  rash consistent with PUPPP on bilateral arms and abdomen   HEENT:  conjunctivae/corneas clear. PERRL.   Lungs:  clear to auscultation bilaterally   Heart:  regular rate and rhythm, S1, S2 normal, no murmur, click, rub or gallop   Abdomen:  soft, non-tender; bowel sounds normal   Uterine Size:  size equals dates   Presentations:  cephalic   FHT:  150 BPM   Cervix:     Dilation: 3cm    Effacement: 65    Station:  -3    Consistency: soft    Position: posterior     Laboratory:  Lab Results   Component Value Date    [facility] B POS 2016     HEPBSAG Negative 2016        Diagnostic Results:  admit labs ordered    ASSESSMENT/PLAN:     41w3d gestation.  Not in labor.   Conditions: N/A     Risks, benefits, alternatives and possible complications have been discussed in detail with the patient.  Pre-admission, admission, and post admission procedures and expectations were discussed in detail.  All questions answered, all appropriate consents will be signed at the Hospital. Admission is planned for today.   Intervention: IV Pitocin induction

## 2017-02-17 NOTE — LACTATION NOTE
Lactation Rounds: Mother latched to the left breast in the cross cradle position upon entering the room, deep latch noted, swallows audible. Mother able to re latch infant independently, nipple shape & color WNL upon unlatching. Lactation packet given and admit information reviewed. Mother verbalizes understanding of expected  behaviors and output for the first 48 hours of life.  Discussed the importance of cue based feedings on demand, unrestricted access to the breast, and frequent uninterrupted skin to skin contact.  Risk and implications of artificial nipples and supplementation discussed.  Encouraged mother to call for assistance when desired or when infant is showing signs of hunger, contact number provided, mother verbalizes understanding.     17 1500   Maternal Medical Surgical History   Behavioral Health Disorder anxiety disorder   Infant Information   Infant's Name Ga   Maternal Infant Assessment   Breast Shape Bilateral:;round   Breast Density Bilateral:;soft   Areola Bilateral:;elastic   Nipple(s) Bilateral:;everted   Additional Documentation LATCH Score (Group)   Infant Assessment   Sucking Reflex present   Rooting Reflex present   Swallow Reflex present   LATCH Score   Latch 2-->grasps breast, tongue down, lips flanged, rhythmic sucking   Audible Swallowing 2-->spontaneous and intermittent (24 hrs old)   Type Of Nipple 2-->everted (after stimulation)   Comfort (Breast/Nipple) 2-->soft/nontender   Hold (Positioning) 2-->no assist from staff, mother able to position/hold infant   Score (less than 7 for 2/more consecutive times, consult Lactation Consultant) 10   Maternal Infant Feeding   Maternal Emotional State relaxed;independent   Infant Positioning cross-cradle  (left breast)   Signs of Milk Transfer audible swallow;infant jaw motion present   Presence of Pain no   Nipple Shape After Feeding, Left WNL   Latch Assistance no   Breastfeeding Education adequate infant intake;adequate milk  volume;importance of skin-to-skin contact   Breastfeeding History   Breastfeeding History yes   Previous Exclusive Breastfeeding yes   Previous Breastfeeding Success unsuccessful   Duration of Previous Breastfeeding 6 weeks   Previous Breastfeeding Problems other (see comments)  (low supply)   Infant First Feeding   Skin-to-Skin Contact Maintained   Feeding Infant   Feeding Readiness Cues rooting   Effective Latch During Feeding yes   Audible Swallow yes   Suck/Swallow Coordination present   Lactation Interventions   Attachment Promotion breastfeeding assistance provided;family involvement promoted;infant-mother separation minimized;rooming-in promoted;skin-to-skin contact encouraged   Breastfeeding Assistance both breasts offered each feeding;feeding on demand promoted;feeding cue recognition promoted;feeding session observed;infant latch-on verified;support offered   Maternal Breastfeeding Support lactation counseling provided;encouragement offered

## 2017-02-18 PROCEDURE — 25000003 PHARM REV CODE 250: Performed by: OBSTETRICS & GYNECOLOGY

## 2017-02-18 PROCEDURE — 11000001 HC ACUTE MED/SURG PRIVATE ROOM

## 2017-02-18 RX ADMIN — IBUPROFEN 600 MG: 600 TABLET, FILM COATED ORAL at 05:02

## 2017-02-18 RX ADMIN — IBUPROFEN 600 MG: 600 TABLET, FILM COATED ORAL at 12:02

## 2017-02-18 RX ADMIN — DOCUSATE SODIUM 100 MG: 100 CAPSULE, LIQUID FILLED ORAL at 08:02

## 2017-02-18 RX ADMIN — FAMOTIDINE 20 MG: 20 TABLET, FILM COATED ORAL at 08:02

## 2017-02-18 NOTE — ANESTHESIA RELEASE NOTE
"Anesthesia Release from PACU Note    Patient: Gwen Herrera    Procedure(s) Performed: * No procedures listed *    Anesthesia type: epidural    Post pain: Adequate analgesia    Post assessment: no apparent anesthetic complications and tolerated procedure well    Last Vitals:   Visit Vitals    BP 90/61    Pulse 94    Temp 37.1 °C (98.7 °F) (Axillary)    Resp 16    Ht 5' 2" (1.575 m)    Wt 72.6 kg (160 lb)    LMP 05/03/2016    SpO2 100%    Breastfeeding Unknown    BMI 29.26 kg/m2       Post vital signs: stable    Level of consciousness: awake, alert  and oriented    Nausea/Vomiting: no nausea/no vomiting    Complications: none    Airway Patency: patent    Respiratory: unassisted, spontaneous ventilation, room air    Cardiovascular: stable and blood pressure at baseline    Hydration: euvolemic  "

## 2017-02-18 NOTE — ANESTHESIA POSTPROCEDURE EVALUATION
"Anesthesia Post Evaluation    Patient: Gwen Herrera    Procedure(s) Performed: * No procedures listed *    Final Anesthesia Type: epidural  Patient location during evaluation: labor & delivery  Patient participation: Yes- Able to Participate  Level of consciousness: awake and alert and oriented  Post-procedure vital signs: reviewed and stable  Pain management: adequate  Airway patency: patent  PONV status at discharge: No PONV  Anesthetic complications: no      Cardiovascular status: blood pressure returned to baseline  Respiratory status: unassisted, spontaneous ventilation and room air  Hydration status: euvolemic  Follow-up not needed.        Visit Vitals    BP 90/61    Pulse 94    Temp 37.1 °C (98.7 °F) (Axillary)    Resp 16    Ht 5' 2" (1.575 m)    Wt 72.6 kg (160 lb)    LMP 05/03/2016    SpO2 100%    Breastfeeding Unknown    BMI 29.26 kg/m2       Pain/Kathy Score: Pain Rating Prior to Med Admin: 2 (2/17/2017  5:54 PM)  Kathy Score: 10 (2/17/2017  4:33 PM)      "

## 2017-02-18 NOTE — PLAN OF CARE
Problem: Patient Care Overview  Goal: Plan of Care Review  Outcome: Ongoing (interventions implemented as appropriate)  Pt progressing, bonding well with baby boy. VSS. Pain controlled with ibuprofen. Ambulating and voiding WNL. Breastfeeding baby often. Will continue to monitor progress.

## 2017-02-18 NOTE — PROGRESS NOTES
[facility]  Vaginal Delivery Progress Note  Obstetrics    SUBJECTIVE:     Gwen Herrera is a 26 y.o. female PPD #1 status post Spontaneous vaginal delivery at 41w3d in a pregnancy complicated by n/a. Patient is doing well this morning. She denies nausea, vomiting, fever or chills. Patient reports mild abdominal pain that is well relieved by oral pain medications. Lochia is mild and stable. Patient is voiding without difficulty and ambulating with no difficulty. Patient does plan to breast feed. OCPs for contraception. She desires circumcision.    OBJECTIVE:     Vital Signs Ranges:  Temp:  [98.1 °F (36.7 °C)-98.7 °F (37.1 °C)] 98.6 °F (37 °C)  Pulse:  [] 90  Resp:  [16-18] 16  SpO2:  [100 %] 100 %  BP: ()/() 126/63    I/O (Last 24H):    Intake/Output Summary (Last 24 hours) at 17 0739  Last data filed at 17 2044   Gross per 24 hour   Intake              200 ml   Output             1200 ml   Net            -1000 ml       Physical Exam:  General:    alert, appears stated age and cooperative           Abdomen:  normal findings: no organomegaly, soft, non-tender and symmetric   Uterine Size:  firm located at the umbilicus.   Incision:  n/a   Extremities:   no edema, redness or tenderness in the calves or thighs     Lab Review:       ASSESSMENT:     Assessment:  Active Hospital Problems    Diagnosis    * (normal spontaneous vaginal delivery)    Outcome of delivery, single liveborn      PLAN:     Plan:  1. Postpartum care:   - Patient doing well. Continue routine management and advances.   - Continue PO pain meds. Pain well controlled.   - Encourage ambulation   - Circumcision: desires   - Contraception: desires OCPs   - Lactation: consult prn    Disposition: As patient meets milestones, will plan to discharge tomorrow.

## 2017-02-18 NOTE — LACTATION NOTE
Lactation Rounds: Infant wt loss and output WNL. Mother reports infant cluster fed overnight. Discussed expected  behaviors and output for first 48 hours of life.  Upon entering room, infant positioned to right breast in cross cradle hold, deep asymmetric latch noted, mother denies pain with latch. Infant fed with audible swallows. Mother independent with position and latch technique. Infant released breast, nipple shape WNL upon unlatching. Mother performed hand expression and nipple care. Mother reports some nipple tenderness. Encouraged mother to call if pain increases. Mother denies any other issues/concerns at this time, will call for assistance as needed.     17 1130   Maternal Infant Assessment   Additional Documentation LATCH Score (Group)   Infant Assessment   Weight Loss (%) 0.7   Number of Stools (24 hours) 1   Number of Voids (24 hours) 1   Maternal Infant Feeding   Infant Positioning cross-cradle   Signs of Milk Transfer audible swallow;infant jaw motion present   Comfort Measures Following Feeding expressed milk applied   Nipple Shape After Feeding, Right WNL   Feeding Infant   Satiety Cues infant releases breast;sleeping after feeding   Effective Latch During Feeding yes   Audible Swallow yes   Lactation Interventions   Attachment Promotion breastfeeding assistance provided;counseling provided;rooming-in promoted;skin-to-skin contact encouraged;infant-mother separation minimized;privacy provided   Breastfeeding Assistance both breasts offered each feeding;feeding cue recognition promoted;feeding on demand promoted;feeding session observed;infant latch-on verified;support offered   Maternal Breastfeeding Support encouragement offered;lactation counseling provided

## 2017-02-18 NOTE — TRANSFER OF CARE
"Anesthesia Transfer of Care Note    Patient: Gwen Herrera    Procedure(s) Performed: * No procedures listed *    Patient location: Labor and Delivery    Anesthesia Type: epidural    Post pain: adequate analgesia    Post assessment: no apparent anesthetic complications    Post vital signs: stable    Level of consciousness: awake, alert and oriented    Nausea/Vomiting: no nausea/vomiting    Complications: none          Last vitals:   Visit Vitals    BP 90/61    Pulse 94    Temp 37.1 °C (98.7 °F) (Axillary)    Resp 16    Ht 5' 2" (1.575 m)    Wt 72.6 kg (160 lb)    LMP 05/03/2016    SpO2 100%    Breastfeeding Unknown    BMI 29.26 kg/m2     "

## 2017-02-18 NOTE — PLAN OF CARE
Problem: Patient Care Overview  Goal: Discharge Needs Assessment  Outcome: Ongoing (interventions implemented as appropriate)  Patient stable at this time. Vitals signs and assessment WDL. No pain or distress noted. Bonding well with infant. Breastfeeding WDL. All questions and concerns addressed. Safety maintained throughout shift. Will continue to monitor.

## 2017-02-19 VITALS
HEIGHT: 62 IN | TEMPERATURE: 98 F | HEART RATE: 104 BPM | DIASTOLIC BLOOD PRESSURE: 73 MMHG | BODY MASS INDEX: 29.44 KG/M2 | SYSTOLIC BLOOD PRESSURE: 119 MMHG | OXYGEN SATURATION: 100 % | RESPIRATION RATE: 18 BRPM | WEIGHT: 160 LBS

## 2017-02-19 PROCEDURE — 25000003 PHARM REV CODE 250: Performed by: OBSTETRICS & GYNECOLOGY

## 2017-02-19 RX ADMIN — IBUPROFEN 600 MG: 600 TABLET, FILM COATED ORAL at 01:02

## 2017-02-19 RX ADMIN — IBUPROFEN 600 MG: 600 TABLET, FILM COATED ORAL at 05:02

## 2017-02-19 RX ADMIN — FAMOTIDINE 20 MG: 20 TABLET, FILM COATED ORAL at 08:02

## 2017-02-19 RX ADMIN — IBUPROFEN 600 MG: 600 TABLET, FILM COATED ORAL at 12:02

## 2017-02-19 RX ADMIN — DOCUSATE SODIUM 100 MG: 100 CAPSULE, LIQUID FILLED ORAL at 08:02

## 2017-02-19 NOTE — LACTATION NOTE
Lactation Rounds: infant output and weight loss WNL. Upon entering room mother has infant to the left breast in the cross cradle position, deep asymmetrical latch noted, swallows audible, mother reports tenderness but denies pain. Instructed mother to call for assistance if pain begins or if tenderness is not resolved within 48 hours; nipple care reviewed. Nipple shape WNL upon unlatching, nipples appear slightly more pink. Lactation discharge information reviewed.  Mother is aware of warm line, and outpatient consultations and monthly support gatherings. Encouraged mother to contact lactation with any questions, concerns, or problems. Contact numbers provided, and mother verbalizes understanding.     02/19/17 0717   Infant Assessment   Weight Loss (%) 3.7   Sucking Reflex present   Rooting Reflex present   Swallow Reflex present   Number of Stools (24 hours) 2   Number of Voids (24 hours) 3   LATCH Score   Latch 2-->grasps breast, tongue down, lips flanged, rhythmic sucking   Audible Swallowing 2-->spontaneous and intermittent (24 hrs old)   Type Of Nipple 2-->everted (after stimulation)   Comfort (Breast/Nipple) 2-->soft/nontender   Hold (Positioning) 2-->no assist from staff, mother able to position/hold infant   Score (less than 7 for 2/more consecutive times, consult Lactation Consultant) 10   Maternal Infant Feeding   Maternal Emotional State independent;relaxed   Infant Positioning cross-cradle  (left breast)   Signs of Milk Transfer audible swallow;infant jaw motion present   Presence of Pain other (see comments)  (reports tenderness but does not rate as pain)   Nipple Shape After Feeding, Left WNL   Breastfeeding Education importance of skin-to-skin contact;adequate milk volume;adequate infant intake   Feeding Infant   Effective Latch During Feeding no   Audible Swallow yes   Lactation Referrals   Lactation Referrals support group   Lactation Interventions   Attachment Promotion counseling provided;family  involvement promoted;infant-mother separation minimized;rooming-in promoted;skin-to-skin contact encouraged   Breast Care: Breastfeeding milk massaged towards nipple   Breastfeeding Assistance both breasts offered each feeding;feeding cue recognition promoted;feeding on demand promoted;feeding session observed;infant latch-on verified;support offered   Maternal Breastfeeding Support lactation counseling provided;encouragement offered

## 2017-02-19 NOTE — DISCHARGE INSTRUCTIONS
"Mother Self Care:    Activity: Avoid strenuous exercise and get adequate rest.  No driving until the physician consent given.  Emotional Changes: Most women find birth to be a time of great emotional upheaval.  Sense of loss, mood swings, fatigue, anxiety, and feeling "let down" are common.  If feelings worsen or last more than a week, call your physician.  Breast Care/Breastfeeding: Wear a bra for comfort.  Keep nipples dry and apply your own breast milk or lanolin cream as needed for soreness.  Engorgement can be relieved with warm, moist heat before feedings.  You may also take Ibuprofen.  Breast Care/Bottle Feeding: Wear support bra 24 hours a day for one week.  Avoid stimulation to breasts.  You may use ice packs for discomfort.  Puma-Care/Vaginal Bleeding: Remember to use your puma-bottle after urinating.  Your flow will change from red, to pink, to yellow/white color over a period of 2 weeks.  Menstruation will return in 3-8 weeks, or longer if breastfeeding.  Episiotomy Vaginal Delivery: Stitches will dissolve within 10 days to 3 weeks.  Warm baths, tucks, and dermoplast will promote healing.  Avoid bubble baths or strong soaps.   Section/Tubal Ligation: Keep incision clean and dry.  Please remove steri-strips in 5-7 days.  You may shower, but avoid baths.  Sexual Activity/Pelvic Rest: No sexual activity, tampons, or douching until your physician gives you consent.  Diet: Continue to eat from the five basic food groups, including plenty of protein, fruits, vegetables, and whole grains.  Limit empty calories and high fat foods.  Drink enough fluids to satisfy thirst and add an extra 500 calories for breastfeeding.  Constipation/Hemorrhoids: Drink plenty of water.  You may take a stool softener or natural laxative (Metamucil). You may use tucks or hemorrhoid ointment and soak in a warm tub.    CALL YOUR OB DOCTOR IF ANY OF THE FOLLOWING OCCURS:  *Heavy bleeding - saturating a pad an hour or passing any " large (2-3 inches in size) blood clots.  *Any pain, redness, or tenderness in lower leg.  *You cannot care for yourself or your baby.  *Any signs of infection-      - Temperature greater than 100.5 degrees F      - Foul smelling vaginal discharge and/or incisional drainage      - Increased episiotomy or incisional pain      - Hot, hard, red or sore area on breast      - Flu-like symptoms      - Any urgency, frequency or burning with urination

## 2017-02-19 NOTE — PLAN OF CARE
Problem: Patient Care Overview  Goal: Plan of Care Review  Outcome: Ongoing (interventions implemented as appropriate)  Pt is progressing well. Pain is controlled with PO pain meds. Ambulates independently and voids without difficulty. Breastfeeding. Bonding well with baby. VSS. Will continue to monitor.

## 2017-02-19 NOTE — DISCHARGE SUMMARY
[facility]  Delivery Discharge Summary  Obstetrics    Admit Date: 2017    Discharge Date and Time: 2017    Primary OB Clinician: Terra Shah CNM    Attending Physician: Angelina Smith MD     Discharge Provider: Carlos Thorpe    Reason for Admission: Induction    Estimated Date of Delivery: 17     Conditions: N/A    Procedures Performed: * No surgery found *    Gwen Herrera is a 26 y.o. now , PPD #2 who was admitted on 2017  4:47 AM for normal spontaneous vaginal delivery. Labor course was adequate.  Patient delivered a single viable  male. Pt was in stable condition post-delivery and was transferred to the Mother-Baby Unit. Her postpartum course was uncomplicated. On discharge day, patient's pain is controlled with oral pain medications. Patient is tolerating PO without nausea or vomiting, ambulating, voiding. She denies SOB and CP. Denies any HA, vision changes, F/C, LE swelling. Denies any breast pain/soreness.     Delivery:    Episiotomy: None   Lacerations: None   Repair suture: None   Repair # of packets: 0   Blood loss (ml): 200     Birth information:  YOB: 2017   Time of birth: 2:23 PM   Sex: male   Delivery type: Vaginal, Spontaneous Delivery   Gestational Age: 41w3d    Delivery Clinician:      Other providers:       Additional  information:  Forceps:    Vacuum:    Breech:    Observed anomalies      Living?:           APGARS  One minute Five minutes Ten minutes   Skin color:         Heart rate:         Grimace:         Muscle tone:         Breathing:         Totals: 9  9        Placenta: Delivered:       appearance    Tubal Ligation: n/a    Feeding Method: the patient is currently breastfeeding.    Immunization Hx:  Immunization History   Administered Date(s) Administered    Tdap 2016    influenza - Quadrivalent - PF (ADULT) 10/18/2016       Final Diagnoses:   Principal Problem:  (normal spontaneous vaginal  delivery)   Secondary Diagnoses:   Active Hospital Problems    Diagnosis  POA    * (normal spontaneous vaginal delivery) [O80]  Not Applicable    Outcome of delivery, single liveborn [Z37.0]  Not Applicable      Resolved Hospital Problems    Diagnosis Date Resolved POA    Indication for care or intervention related to labor and delivery [O75.9] 2017 Yes       Discharged Condition: good    Disposition: Home or Self Care    Follow Up/Patient Instructions:     Medications:   Gwen Herrera   Home Medication Instructions JOSE:[id number]    Printed on:17   Medication Information                      prenatal vitamin #45-iron-FA 28 mg iron- 1 mg Chew  Take by mouth.               No discharge procedures on file.  Follow-up Information     Follow up with Terra Shah CNM In 4 weeks.    Specialty:  Obstetrics    Why:  PP    Contact information:    [address]  [place] [address] [phone]

## 2017-03-20 ENCOUNTER — POSTPARTUM VISIT (OUTPATIENT)
Dept: OBSTETRICS AND GYNECOLOGY | Facility: CLINIC | Age: 27
End: 2017-03-20
Payer: OTHER GOVERNMENT

## 2017-03-20 VITALS
HEIGHT: 62 IN | BODY MASS INDEX: 24.09 KG/M2 | SYSTOLIC BLOOD PRESSURE: 110 MMHG | DIASTOLIC BLOOD PRESSURE: 70 MMHG | WEIGHT: 130.94 LBS

## 2017-03-20 PROCEDURE — 99999 PR PBB SHADOW E&M-EST. PATIENT-LVL II: CPT | Mod: PBBFAC,,, | Performed by: ADVANCED PRACTICE MIDWIFE

## 2017-03-20 PROCEDURE — 99212 OFFICE O/P EST SF 10 MIN: CPT | Mod: PBBFAC | Performed by: ADVANCED PRACTICE MIDWIFE

## 2017-03-20 PROCEDURE — 0503F POSTPARTUM CARE VISIT: CPT | Mod: ,,, | Performed by: ADVANCED PRACTICE MIDWIFE

## 2017-03-20 RX ORDER — ACETAMINOPHEN AND CODEINE PHOSPHATE 120; 12 MG/5ML; MG/5ML
1 SOLUTION ORAL DAILY
Qty: 28 TABLET | Refills: 11 | Status: SHIPPED | OUTPATIENT
Start: 2017-03-20 | End: 2018-03-20

## 2017-03-20 NOTE — PROGRESS NOTES
26 y.o. female for postpartum visit.  Patient has no complaints.  Her delivery records were reviewed.  She is breast feeding infant.    Exam  General - well appearing, no apparent distress  Abdomen - soft, non tender, non distended incision none.  Pelvic - normal external genitalia, any lacerations well healed               uterus non tender, appropriately sized  Extremeties - no edema    Assessment:  Encounter Diagnosis   Name Primary?    Routine postpartum follow-up Yes        F/u -discussed BC options, desires OCPs, exp micronor use, call when weaning to change to combination pills, return for annual exam. al

## 2017-06-09 ENCOUNTER — TELEPHONE (OUTPATIENT)
Dept: OBSTETRICS AND GYNECOLOGY | Facility: CLINIC | Age: 27
End: 2017-06-09

## 2017-06-09 NOTE — TELEPHONE ENCOUNTER
----- Message from Jt Sheppard sent at 6/9/2017  3:33 PM CDT -----  Contact: Pt   ..Patient is calling for PAP smear results. Please call patient at  [phone]. Thanks!

## 2017-06-09 NOTE — TELEPHONE ENCOUNTER
Patient calling to see if she can come by the office this afternoon to  her last pap result.  I informed the patient that we would print and have it at the  at the  location.  Patient voiced understanding.  Result printed.

## 2022-02-15 ENCOUNTER — APPOINTMENT (RX ONLY)
Dept: URBAN - METROPOLITAN AREA CLINIC 156 | Facility: CLINIC | Age: 32
Setting detail: DERMATOLOGY
End: 2022-02-15

## 2022-02-15 VITALS — WEIGHT: 114 LBS | HEIGHT: 62 IN

## 2022-02-15 DIAGNOSIS — L70.0 ACNE VULGARIS: ICD-10-CM | Status: INADEQUATELY CONTROLLED

## 2022-02-15 PROCEDURE — 99203 OFFICE O/P NEW LOW 30 MIN: CPT

## 2022-02-15 PROCEDURE — ? PRESCRIPTION SAMPLES PROVIDED

## 2022-02-15 PROCEDURE — ? COUNSELING

## 2022-02-15 PROCEDURE — ? TREATMENT REGIMEN

## 2022-02-15 PROCEDURE — ? PRESCRIPTION

## 2022-02-15 RX ORDER — CLASCOTERONE 1 G/100G
CREAM TOPICAL
Qty: 60 | Refills: 2 | Status: ERX | COMMUNITY
Start: 2022-02-15

## 2022-02-15 RX ORDER — TRETIONIN 0.25 MG/G
CREAM TOPICAL QHS
Qty: 45 | Refills: 1 | Status: ERX | COMMUNITY
Start: 2022-02-15

## 2022-02-15 RX ORDER — SPIRONOLACTONE 50 MG/1
TABLET, FILM COATED ORAL
Qty: 30 | Refills: 1 | Status: ERX | COMMUNITY
Start: 2022-02-15

## 2022-02-15 RX ADMIN — CLASCOTERONE: 1 CREAM TOPICAL at 00:00

## 2022-02-15 RX ADMIN — TRETIONIN: 0.25 CREAM TOPICAL at 00:00

## 2022-02-15 RX ADMIN — SPIRONOLACTONE: 50 TABLET, FILM COATED ORAL at 00:00

## 2022-02-15 ASSESSMENT — LOCATION SIMPLE DESCRIPTION DERM
LOCATION SIMPLE: RIGHT FOREHEAD
LOCATION SIMPLE: LEFT CHEEK
LOCATION SIMPLE: RIGHT CHEEK
LOCATION SIMPLE: LEFT FOREHEAD

## 2022-02-15 ASSESSMENT — LOCATION DETAILED DESCRIPTION DERM
LOCATION DETAILED: LEFT INFERIOR CENTRAL MALAR CHEEK
LOCATION DETAILED: RIGHT INFERIOR LATERAL MALAR CHEEK
LOCATION DETAILED: LEFT LATERAL FOREHEAD
LOCATION DETAILED: RIGHT FOREHEAD
LOCATION DETAILED: RIGHT INFERIOR CENTRAL MALAR CHEEK
LOCATION DETAILED: LEFT LATERAL BUCCAL CHEEK
LOCATION DETAILED: RIGHT LATERAL FOREHEAD
LOCATION DETAILED: LEFT MEDIAL FOREHEAD

## 2022-02-15 ASSESSMENT — LOCATION ZONE DERM: LOCATION ZONE: FACE

## 2022-02-15 NOTE — PROCEDURE: MIPS QUALITY
Quality 110: Preventive Care And Screening: Influenza Immunization: Influenza Immunization previously received during influenza season
Detail Level: Detailed
[id number]

## 2022-02-15 NOTE — PROCEDURE: TREATMENT REGIMEN
Plan: Provider recommended using La rocsche Posay, CeraVe, or Cetaphil cleanser as well as moisturizer after applying prescription creams in the case of face drying.
Detail Level: Simple
Initiate Treatment: tretinoin 0.025 % topical cream QHS: Apply a pea sized amount to the face starting every other night and increase to every night as tolerated.\\n\\nspironolactone 50 mg tablet: Take 1 tablet PO once daily.\\n\\nWinlevi 1 % topical cream: Apply to affected areas on face 1-2 x per day. Use to spot treat in the morning.

## 2022-02-15 NOTE — PROCEDURE: COUNSELING
Topical Retinoid counseling:  Patient advised to apply a pea-sized amount only at bedtime and wait 30 minutes after washing their face before applying.  If too drying, patient may add a non-comedogenic moisturizer. The patient verbalized understanding of the proper use and possible adverse effects of retinoids.  All of the patient's questions and concerns were addressed.
Spironolactone Pregnancy And Lactation Text: This medication can cause feminization of the male fetus and should be avoided during pregnancy. The active metabolite is also found in breast milk.
Bactrim Pregnancy And Lactation Text: This medication is Pregnancy Category D and is known to cause fetal risk.  It is also excreted in breast milk.
Topical Clindamycin Counseling: Patient counseled that this medication may cause skin irritation or allergic reactions.  In the event of skin irritation, the patient was advised to reduce the amount of the drug applied or use it less frequently.   The patient verbalized understanding of the proper use and possible adverse effects of clindamycin.  All of the patient's questions and concerns were addressed.
Isotretinoin Counseling: Patient should get monthly blood tests, not donate blood, not drive at night if vision affected, not share medication, and not undergo elective surgery for 6 months after tx completed. Side effects reviewed, pt to contact office should one occur.
Azelaic Acid Counseling: Patient counseled that medicine may cause skin irritation and to avoid applying near the eyes.  In the event of skin irritation, the patient was advised to reduce the amount of the drug applied or use it less frequently.   The patient verbalized understanding of the proper use and possible adverse effects of azelaic acid.  All of the patient's questions and concerns were addressed.
Isotretinoin Pregnancy And Lactation Text: This medication is Pregnancy Category X and is considered extremely dangerous during pregnancy. It is unknown if it is excreted in breast milk.
Azelaic Acid Pregnancy And Lactation Text: This medication is considered safe during pregnancy and breast feeding.
Doxycycline Counseling:  Patient counseled regarding possible photosensitivity and increased risk for sunburn.  Patient instructed to avoid sunlight, if possible.  When exposed to sunlight, patients should wear protective clothing, sunglasses, and sunscreen.  The patient was instructed to call the office immediately if the following severe adverse effects occur:  hearing changes, easy bruising/bleeding, severe headache, or vision changes.  The patient verbalized understanding of the proper use and possible adverse effects of doxycycline.  All of the patient's questions and concerns were addressed.
Use Enhanced Medication Counseling?: No
Minocycline Pregnancy And Lactation Text: This medication is Pregnancy Category D and not consider safe during pregnancy. It is also excreted in breast milk.
Sarecycline Counseling: Patient advised regarding possible photosensitivity and discoloration of the teeth, skin, lips, tongue and gums.  Patient instructed to avoid sunlight, if possible.  When exposed to sunlight, patients should wear protective clothing, sunglasses, and sunscreen.  The patient was instructed to call the office immediately if the following severe adverse effects occur:  hearing changes, easy bruising/bleeding, severe headache, or vision changes.  The patient verbalized understanding of the proper use and possible adverse effects of sarecycline.  All of the patient's questions and concerns were addressed.
Azithromycin Counseling:  I discussed with the patient the risks of azithromycin including but not limited to GI upset, allergic reaction, drug rash, diarrhea, and yeast infections.
Tetracycline Counseling: Patient counseled regarding possible photosensitivity and increased risk for sunburn.  Patient instructed to avoid sunlight, if possible.  When exposed to sunlight, patients should wear protective clothing, sunglasses, and sunscreen.  The patient was instructed to call the office immediately if the following severe adverse effects occur:  hearing changes, easy bruising/bleeding, severe headache, or vision changes.  The patient verbalized understanding of the proper use and possible adverse effects of tetracycline.  All of the patient's questions and concerns were addressed. Patient understands to avoid pregnancy while on therapy due to potential birth defects.
Topical Clindamycin Pregnancy And Lactation Text: This medication is Pregnancy Category B and is considered safe during pregnancy. It is unknown if it is excreted in breast milk.
Birth Control Pills Counseling: Birth Control Pill Counseling: I discussed with the patient the potential side effects of OCPs including but not limited to increased risk of stroke, heart attack, thrombophlebitis, deep venous thrombosis, hepatic adenomas, breast changes, GI upset, headaches, and depression.  The patient verbalized understanding of the proper use and possible adverse effects of OCPs. All of the patient's questions and concerns were addressed.
Topical Sulfur Applications Counseling: Topical Sulfur Counseling: Patient counseled that this medication may cause skin irritation or allergic reactions.  In the event of skin irritation, the patient was advised to reduce the amount of the drug applied or use it less frequently.   The patient verbalized understanding of the proper use and possible adverse effects of topical sulfur application.  All of the patient's questions and concerns were addressed.
Birth Control Pills Pregnancy And Lactation Text: This medication should be avoided if pregnant and for the first 30 days post-partum.
Winlevi Counseling:  I discussed with the patient the risks of topical clascoterone including but not limited to erythema, scaling, itching, and stinging. Patient voiced their understanding.
Topical Retinoid Pregnancy And Lactation Text: This medication is Pregnancy Category C. It is unknown if this medication is excreted in breast milk.
Doxycycline Pregnancy And Lactation Text: This medication is Pregnancy Category D and not consider safe during pregnancy. It is also excreted in breast milk but is considered safe for shorter treatment courses.
Winlevi Pregnancy And Lactation Text: This medication is considered safe during pregnancy and breastfeeding.
Erythromycin Counseling:  I discussed with the patient the risks of erythromycin including but not limited to GI upset, allergic reaction, drug rash, diarrhea, increase in liver enzymes, and yeast infections.
Azithromycin Pregnancy And Lactation Text: This medication is considered safe during pregnancy and is also secreted in breast milk.
High Dose Vitamin A Counseling: Side effects reviewed, pt to contact office should one occur.
High Dose Vitamin A Pregnancy And Lactation Text: High dose vitamin A therapy is contraindicated during pregnancy and breast feeding.
Topical Sulfur Applications Pregnancy And Lactation Text: This medication is Pregnancy Category C and has an unknown safety profile during pregnancy. It is unknown if this topical medication is excreted in breast milk.
Benzoyl Peroxide Counseling: Patient counseled that medicine may cause skin irritation and bleach clothing.  In the event of skin irritation, the patient was advised to reduce the amount of the drug applied or use it less frequently.   The patient verbalized understanding of the proper use and possible adverse effects of benzoyl peroxide.  All of the patient's questions and concerns were addressed.
Dapsone Counseling: I discussed with the patient the risks of dapsone including but not limited to hemolytic anemia, agranulocytosis, rashes, methemoglobinemia, kidney failure, peripheral neuropathy, headaches, GI upset, and liver toxicity.  Patients who start dapsone require monitoring including baseline LFTs and weekly CBCs for the first month, then every month thereafter.  The patient verbalized understanding of the proper use and possible adverse effects of dapsone.  All of the patient's questions and concerns were addressed.
Tazorac Counseling:  Patient advised that medication is irritating and drying.  Patient may need to apply sparingly and wash off after an hour before eventually leaving it on overnight.  The patient verbalized understanding of the proper use and possible adverse effects of tazorac.  All of the patient's questions and concerns were addressed.
Spironolactone Counseling: Patient advised regarding risks of diarrhea, abdominal pain, hyperkalemia, birth defects (for female patients), liver toxicity and renal toxicity. The patient may need blood work to monitor liver and kidney function and potassium levels while on therapy. The patient verbalized understanding of the proper use and possible adverse effects of spironolactone.  All of the patient's questions and concerns were addressed.
Tazorac Pregnancy And Lactation Text: This medication is not safe during pregnancy. It is unknown if this medication is excreted in breast milk.
Erythromycin Pregnancy And Lactation Text: This medication is Pregnancy Category B and is considered safe during pregnancy. It is also excreted in breast milk.
Detail Level: Simple
Bactrim Counseling:  I discussed with the patient the risks of sulfa antibiotics including but not limited to GI upset, allergic reaction, drug rash, diarrhea, dizziness, photosensitivity, and yeast infections.  Rarely, more serious reactions can occur including but not limited to aplastic anemia, agranulocytosis, methemoglobinemia, blood dyscrasias, liver or kidney failure, lung infiltrates or desquamative/blistering drug rashes.
Dapsone Pregnancy And Lactation Text: This medication is Pregnancy Category C and is not considered safe during pregnancy or breast feeding.
Minocycline Counseling: Patient advised regarding possible photosensitivity and discoloration of the teeth, skin, lips, tongue and gums.  Patient instructed to avoid sunlight, if possible.  When exposed to sunlight, patients should wear protective clothing, sunglasses, and sunscreen.  The patient was instructed to call the office immediately if the following severe adverse effects occur:  hearing changes, easy bruising/bleeding, severe headache, or vision changes.  The patient verbalized understanding of the proper use and possible adverse effects of minocycline.  All of the patient's questions and concerns were addressed.
Benzoyl Peroxide Pregnancy And Lactation Text: This medication is Pregnancy Category C. It is unknown if benzoyl peroxide is excreted in breast milk.

## 2022-04-15 ENCOUNTER — APPOINTMENT (RX ONLY)
Dept: URBAN - METROPOLITAN AREA CLINIC 156 | Facility: CLINIC | Age: 32
Setting detail: DERMATOLOGY
End: 2022-04-15

## 2022-04-15 VITALS — HEIGHT: 62 IN | WEIGHT: 117 LBS

## 2022-04-15 DIAGNOSIS — L70.0 ACNE VULGARIS: ICD-10-CM

## 2022-04-15 PROCEDURE — 99213 OFFICE O/P EST LOW 20 MIN: CPT

## 2022-04-15 PROCEDURE — ? COUNSELING

## 2022-04-15 PROCEDURE — ? TREATMENT REGIMEN

## 2022-04-15 PROCEDURE — ? PRESCRIPTION

## 2022-04-15 PROCEDURE — ? DIAGNOSIS COMMENT

## 2022-04-15 RX ORDER — SPIRONOLACTONE 50 MG/1
TABLET, FILM COATED ORAL
Qty: 60 | Refills: 6 | Status: ERX

## 2022-04-15 ASSESSMENT — LOCATION DETAILED DESCRIPTION DERM
LOCATION DETAILED: RIGHT LATERAL FOREHEAD
LOCATION DETAILED: RIGHT INFERIOR CENTRAL MALAR CHEEK
LOCATION DETAILED: LEFT MEDIAL FOREHEAD
LOCATION DETAILED: RIGHT FOREHEAD
LOCATION DETAILED: LEFT LATERAL BUCCAL CHEEK
LOCATION DETAILED: LEFT LATERAL FOREHEAD
LOCATION DETAILED: LEFT INFERIOR CENTRAL MALAR CHEEK
LOCATION DETAILED: RIGHT INFERIOR LATERAL MALAR CHEEK

## 2022-04-15 ASSESSMENT — LOCATION SIMPLE DESCRIPTION DERM
LOCATION SIMPLE: LEFT FOREHEAD
LOCATION SIMPLE: RIGHT FOREHEAD
LOCATION SIMPLE: RIGHT CHEEK
LOCATION SIMPLE: LEFT CHEEK

## 2022-04-15 ASSESSMENT — LOCATION ZONE DERM: LOCATION ZONE: FACE

## 2022-04-15 NOTE — PROCEDURE: TREATMENT REGIMEN
Modify Regimen: spironolactone 50 mg tablet: Take 1 tablet PO BID
Continue Regimen: tretinoin 0.025 % topical cream QHS: Apply a pea sized amount to the face starting every other night and increase to every night as tolerated.\\n\\nWinlevi 1 % topical cream: Apply to affected areas on face 1-2 x per day. Use to spot treat in the morning.
Plan: Provider recommended using La rocsche Posay, CeraVe, or Cetaphil cleanser as well as moisturizer after applying prescription creams in the case of face drying. Stop Tretinoin 3 days prior facials or eyebrow waxing.
Detail Level: Simple

## 2022-07-15 ENCOUNTER — APPOINTMENT (RX ONLY)
Dept: URBAN - METROPOLITAN AREA CLINIC 156 | Facility: CLINIC | Age: 32
Setting detail: DERMATOLOGY
End: 2022-07-15

## 2022-07-15 VITALS — HEIGHT: 62 IN | WEIGHT: 118 LBS

## 2022-07-15 DIAGNOSIS — L70.0 ACNE VULGARIS: ICD-10-CM

## 2022-07-15 PROCEDURE — ? TREATMENT REGIMEN

## 2022-07-15 PROCEDURE — 99213 OFFICE O/P EST LOW 20 MIN: CPT

## 2022-07-15 PROCEDURE — ? COUNSELING

## 2022-07-15 ASSESSMENT — LOCATION SIMPLE DESCRIPTION DERM
LOCATION SIMPLE: RIGHT CHEEK
LOCATION SIMPLE: RIGHT FOREHEAD
LOCATION SIMPLE: LEFT CHEEK
LOCATION SIMPLE: LEFT FOREHEAD

## 2022-07-15 ASSESSMENT — LOCATION DETAILED DESCRIPTION DERM
LOCATION DETAILED: LEFT LATERAL FOREHEAD
LOCATION DETAILED: RIGHT INFERIOR LATERAL MALAR CHEEK
LOCATION DETAILED: LEFT LATERAL BUCCAL CHEEK
LOCATION DETAILED: RIGHT INFERIOR CENTRAL MALAR CHEEK
LOCATION DETAILED: LEFT MEDIAL FOREHEAD
LOCATION DETAILED: RIGHT FOREHEAD
LOCATION DETAILED: LEFT INFERIOR CENTRAL MALAR CHEEK
LOCATION DETAILED: RIGHT LATERAL FOREHEAD

## 2022-07-15 ASSESSMENT — LOCATION ZONE DERM: LOCATION ZONE: FACE

## 2022-07-15 NOTE — PROCEDURE: TREATMENT REGIMEN
Continue Regimen: tretinoin 0.025 % topical cream QHS: Apply a pea sized amount to the face starting every other night and increase to every night as tolerated.\\n\\nWinlevi 1 % topical cream: Apply to affected areas on face 1-2 x per day. Use to spot treat in the morning.\\n\\nspironolactone 50 mg tablet: Take 1 tablet PO BID
Plan: Provider recommended using [place] Posay, CeraVe, or Cetaphil cleanser as well as moisturizer after applying prescription creams in the case of face drying. Stop Tretinoin 3 days prior facials or eyebrow waxing.
Detail Level: Simple

## 2023-01-20 ENCOUNTER — APPOINTMENT (RX ONLY)
Dept: URBAN - METROPOLITAN AREA CLINIC 154 | Facility: CLINIC | Age: 33
Setting detail: DERMATOLOGY
End: 2023-01-20

## 2023-01-20 DIAGNOSIS — Z41.9 ENCOUNTER FOR PROCEDURE FOR PURPOSES OTHER THAN REMEDYING HEALTH STATE, UNSPECIFIED: ICD-10-CM

## 2023-01-20 PROCEDURE — ? COSMETIC CONSULTATION: SKIN CARE PRODUCTS AND SERVICES

## 2023-02-02 ENCOUNTER — APPOINTMENT (RX ONLY)
Dept: URBAN - METROPOLITAN AREA CLINIC 154 | Facility: CLINIC | Age: 33
Setting detail: DERMATOLOGY
End: 2023-02-02

## 2023-02-02 DIAGNOSIS — Z41.9 ENCOUNTER FOR PROCEDURE FOR PURPOSES OTHER THAN REMEDYING HEALTH STATE, UNSPECIFIED: ICD-10-CM

## 2023-02-02 PROCEDURE — ? COOLSCULPTING

## 2023-02-02 NOTE — PROCEDURE: COOLSCULPTING
Location 1: lower abdomen (right)
Suction Settings: The suction settings were per protocol.
Applicator Size: CoolAdvantage Core
Number Of Cycles: 1
Time (Minutes - Will Only Render If Nonzero): 35
Location 2: lower abdomen (left)
Treatment Administered By (Optional): salo
Applicator Size: standard applicator
Time (Minutes - Will Only Render If Nonzero): 35
Location 3: mid abdomen (right)
Applicator Size: CoolAdvantage Curve
Device: Coolsculpting
Time (Minutes - Will Only Render If Nonzero): 0
Intro: Prior to treatment, the area was cleaned with alcohol and marked out with a marking pen. The gel sheet was then applied uniformly. The applicator was applied to the skin with good contact and suction.
Applicator Size: CoolCurve Contour
Post Treatment: After treatment, the suction was turned off, and the applicator was removed from the skin.
Detail Level: Zone
Consent: Written consent obtained, risks reviewed including, but not limited to, blistering from suction, darker or lighter pigmentary change, bruising, and/or need for multiple treatments.

## 2023-04-07 ENCOUNTER — APPOINTMENT (RX ONLY)
Dept: URBAN - METROPOLITAN AREA CLINIC 154 | Facility: CLINIC | Age: 33
Setting detail: DERMATOLOGY
End: 2023-04-07

## 2023-04-07 DIAGNOSIS — Z41.9 ENCOUNTER FOR PROCEDURE FOR PURPOSES OTHER THAN REMEDYING HEALTH STATE, UNSPECIFIED: ICD-10-CM

## 2023-04-07 PROCEDURE — ? COOLSCULPTING

## 2023-04-07 NOTE — PROCEDURE: COOLSCULPTING
Number Of Cycles: 1
Time (Minutes - Will Only Render If Nonzero): 0
Suction Settings: The suction settings were per protocol.
Applicator Size: standard applicator
Consent: Written consent obtained, risks reviewed including, but not limited to, blistering from suction, darker or lighter pigmentary change, bruising, and/or need for multiple treatments.
Location 1: lower abdomen
Applicator Size: CoolAdvantage Curve
Time (Minutes - Will Only Render If Nonzero): 35
Detail Level: Zone
Applicator Size: CoolAdvantage Core
Time (Minutes - Will Only Render If Nonzero): 35
Treatment Administered By (Optional): salo
Applicator Size: CoolCurve Contour
Intro: Prior to treatment, the area was cleaned with alcohol and marked out with a marking pen. The gel sheet was then applied uniformly. The applicator was applied to the skin with good contact and suction.
Device: Coolsculpting
Post Treatment: After treatment, the suction was turned off, and the applicator was removed from the skin.